# Patient Record
Sex: FEMALE | Race: WHITE | Employment: OTHER
[De-identification: names, ages, dates, MRNs, and addresses within clinical notes are randomized per-mention and may not be internally consistent; named-entity substitution may affect disease eponyms.]

---

## 2017-02-01 DIAGNOSIS — J45.40 MODERATE PERSISTENT ASTHMA WITHOUT COMPLICATION: ICD-10-CM

## 2017-02-01 DIAGNOSIS — K21.9 GASTROESOPHAGEAL REFLUX DISEASE WITHOUT ESOPHAGITIS: ICD-10-CM

## 2017-02-01 RX ORDER — RANITIDINE 150 MG/1
TABLET ORAL
Qty: 180 TABLET | Refills: 5 | Status: SHIPPED | OUTPATIENT
Start: 2017-02-01 | End: 2018-02-06 | Stop reason: SDUPTHER

## 2017-02-01 RX ORDER — MONTELUKAST SODIUM 10 MG/1
TABLET ORAL
Qty: 90 TABLET | Refills: 5 | Status: SHIPPED | OUTPATIENT
Start: 2017-02-01 | End: 2018-02-06 | Stop reason: SDUPTHER

## 2017-02-10 DIAGNOSIS — M85.80 OSTEOPENIA: ICD-10-CM

## 2017-02-11 RX ORDER — RALOXIFENE HYDROCHLORIDE 60 MG/1
TABLET, FILM COATED ORAL
Qty: 90 TABLET | Refills: 0 | Status: SHIPPED | OUTPATIENT
Start: 2017-02-11 | End: 2017-02-28 | Stop reason: ALTCHOICE

## 2017-02-11 RX ORDER — PRAVASTATIN SODIUM 20 MG
TABLET ORAL
Qty: 90 TABLET | Refills: 0 | Status: SHIPPED | OUTPATIENT
Start: 2017-02-11 | End: 2017-08-30 | Stop reason: SDUPTHER

## 2017-02-15 ENCOUNTER — TELEPHONE (OUTPATIENT)
Dept: INTERNAL MEDICINE | Facility: CLINIC | Age: 72
End: 2017-02-15

## 2017-02-15 RX ORDER — AZITHROMYCIN 250 MG/1
TABLET, FILM COATED ORAL
Qty: 1 PACKET | Refills: 0 | Status: SHIPPED | OUTPATIENT
Start: 2017-02-15 | End: 2017-02-28 | Stop reason: ALTCHOICE

## 2017-02-28 ENCOUNTER — OFFICE VISIT (OUTPATIENT)
Dept: INTERNAL MEDICINE | Facility: CLINIC | Age: 72
End: 2017-02-28

## 2017-02-28 VITALS
HEART RATE: 89 BPM | SYSTOLIC BLOOD PRESSURE: 128 MMHG | DIASTOLIC BLOOD PRESSURE: 83 MMHG | TEMPERATURE: 98.3 F | OXYGEN SATURATION: 98 % | WEIGHT: 159 LBS | BODY MASS INDEX: 31.05 KG/M2

## 2017-02-28 DIAGNOSIS — H65.33 CHRONIC MUCOID OTITIS MEDIA OF BOTH EARS: Primary | ICD-10-CM

## 2017-02-28 PROCEDURE — 3014F SCREEN MAMMO DOC REV: CPT | Performed by: INTERNAL MEDICINE

## 2017-02-28 PROCEDURE — 1036F TOBACCO NON-USER: CPT | Performed by: INTERNAL MEDICINE

## 2017-02-28 PROCEDURE — G8399 PT W/DXA RESULTS DOCUMENT: HCPCS | Performed by: INTERNAL MEDICINE

## 2017-02-28 PROCEDURE — 1123F ACP DISCUSS/DSCN MKR DOCD: CPT | Performed by: INTERNAL MEDICINE

## 2017-02-28 PROCEDURE — 3017F COLORECTAL CA SCREEN DOC REV: CPT | Performed by: INTERNAL MEDICINE

## 2017-02-28 PROCEDURE — 1090F PRES/ABSN URINE INCON ASSESS: CPT | Performed by: INTERNAL MEDICINE

## 2017-02-28 PROCEDURE — G8427 DOCREV CUR MEDS BY ELIG CLIN: HCPCS | Performed by: INTERNAL MEDICINE

## 2017-02-28 PROCEDURE — 4040F PNEUMOC VAC/ADMIN/RCVD: CPT | Performed by: INTERNAL MEDICINE

## 2017-02-28 PROCEDURE — G8417 CALC BMI ABV UP PARAM F/U: HCPCS | Performed by: INTERNAL MEDICINE

## 2017-02-28 PROCEDURE — 99213 OFFICE O/P EST LOW 20 MIN: CPT | Performed by: INTERNAL MEDICINE

## 2017-02-28 PROCEDURE — G8484 FLU IMMUNIZE NO ADMIN: HCPCS | Performed by: INTERNAL MEDICINE

## 2017-02-28 RX ORDER — ECHINACEA PURPUREA EXTRACT 125 MG
1 TABLET ORAL PRN
Qty: 1 BOTTLE | Refills: 3 | Status: SHIPPED | OUTPATIENT
Start: 2017-02-28

## 2017-02-28 RX ORDER — PREDNISONE 10 MG/1
TABLET ORAL
Qty: 20 TABLET | Refills: 0 | Status: SHIPPED | OUTPATIENT
Start: 2017-02-28 | End: 2018-10-03

## 2017-02-28 RX ORDER — GUAIFENESIN AND PSEUDOEPHEDRINE HCL 1200; 120 MG/1; MG/1
1 TABLET, EXTENDED RELEASE ORAL 2 TIMES DAILY
Qty: 20 TABLET | Refills: 2 | Status: SHIPPED | OUTPATIENT
Start: 2017-02-28 | End: 2017-10-05 | Stop reason: ALTCHOICE

## 2017-02-28 RX ORDER — FLUTICASONE PROPIONATE 50 MCG
1 SPRAY, SUSPENSION (ML) NASAL DAILY
Qty: 1 BOTTLE | Refills: 3 | Status: SHIPPED | OUTPATIENT
Start: 2017-02-28 | End: 2017-10-05 | Stop reason: SDUPTHER

## 2017-07-03 ENCOUNTER — HOSPITAL ENCOUNTER (EMERGENCY)
Age: 72
Discharge: HOME | End: 2017-07-03
Attending: EMERGENCY MEDICINE
Payer: MEDICARE

## 2017-07-03 VITALS
RESPIRATION RATE: 18 BRPM | WEIGHT: 160.4 LBS | TEMPERATURE: 98.6 F | BODY MASS INDEX: 31.49 KG/M2 | SYSTOLIC BLOOD PRESSURE: 158 MMHG | HEIGHT: 60 IN | OXYGEN SATURATION: 95 % | HEART RATE: 79 BPM | DIASTOLIC BLOOD PRESSURE: 77 MMHG

## 2017-07-03 DIAGNOSIS — L03.211 FACIAL CELLULITIS: Primary | ICD-10-CM

## 2017-07-03 PROCEDURE — 99283 EMERGENCY DEPT VISIT LOW MDM: CPT

## 2017-07-03 RX ORDER — DOXYCYCLINE HYCLATE 100 MG
100 TABLET ORAL 2 TIMES DAILY
Qty: 20 TABLET | Refills: 0 | Status: SHIPPED | OUTPATIENT
Start: 2017-07-03 | End: 2018-10-03

## 2017-07-03 ASSESSMENT — ENCOUNTER SYMPTOMS
SHORTNESS OF BREATH: 0
SINUS PRESSURE: 0
RHINORRHEA: 0
VOMITING: 0
FACIAL SWELLING: 1
ABDOMINAL PAIN: 0
SORE THROAT: 0
DIARRHEA: 0
COUGH: 0
COLOR CHANGE: 0
CONSTIPATION: 0
NAUSEA: 0
WHEEZING: 0

## 2017-08-28 DIAGNOSIS — J45.40 MODERATE PERSISTENT ASTHMA WITHOUT COMPLICATION: ICD-10-CM

## 2017-08-28 RX ORDER — BUDESONIDE AND FORMOTEROL FUMARATE DIHYDRATE 160; 4.5 UG/1; UG/1
AEROSOL RESPIRATORY (INHALATION)
Qty: 30.6 G | Refills: 2 | Status: SHIPPED | OUTPATIENT
Start: 2017-08-28 | End: 2017-10-05 | Stop reason: SDUPTHER

## 2017-08-30 RX ORDER — PRAVASTATIN SODIUM 20 MG
TABLET ORAL
Qty: 90 TABLET | Refills: 0 | Status: SHIPPED | OUTPATIENT
Start: 2017-08-30 | End: 2017-10-26 | Stop reason: SDUPTHER

## 2017-10-05 ENCOUNTER — OFFICE VISIT (OUTPATIENT)
Dept: INTERNAL MEDICINE | Age: 72
End: 2017-10-05
Payer: MEDICARE

## 2017-10-05 VITALS
DIASTOLIC BLOOD PRESSURE: 82 MMHG | WEIGHT: 160 LBS | SYSTOLIC BLOOD PRESSURE: 145 MMHG | OXYGEN SATURATION: 98 % | HEART RATE: 75 BPM | BODY MASS INDEX: 31.25 KG/M2

## 2017-10-05 DIAGNOSIS — Z13.820 OSTEOPOROSIS SCREENING: ICD-10-CM

## 2017-10-05 DIAGNOSIS — J45.40 MODERATE PERSISTENT ASTHMA WITHOUT COMPLICATION: ICD-10-CM

## 2017-10-05 DIAGNOSIS — Z13.220 SCREENING FOR HYPERLIPIDEMIA: Primary | ICD-10-CM

## 2017-10-05 DIAGNOSIS — E11.65 TYPE 2 DIABETES MELLITUS WITH HYPERGLYCEMIA, WITHOUT LONG-TERM CURRENT USE OF INSULIN (HCC): ICD-10-CM

## 2017-10-05 DIAGNOSIS — Z12.39 BREAST CANCER SCREENING: ICD-10-CM

## 2017-10-05 DIAGNOSIS — K21.9 GASTROESOPHAGEAL REFLUX DISEASE WITHOUT ESOPHAGITIS: ICD-10-CM

## 2017-10-05 LAB — HBA1C MFR BLD: 5.9 %

## 2017-10-05 PROCEDURE — 1090F PRES/ABSN URINE INCON ASSESS: CPT | Performed by: INTERNAL MEDICINE

## 2017-10-05 PROCEDURE — 3017F COLORECTAL CA SCREEN DOC REV: CPT | Performed by: INTERNAL MEDICINE

## 2017-10-05 PROCEDURE — G8417 CALC BMI ABV UP PARAM F/U: HCPCS | Performed by: INTERNAL MEDICINE

## 2017-10-05 PROCEDURE — 83036 HEMOGLOBIN GLYCOSYLATED A1C: CPT | Performed by: INTERNAL MEDICINE

## 2017-10-05 PROCEDURE — 3046F HEMOGLOBIN A1C LEVEL >9.0%: CPT | Performed by: INTERNAL MEDICINE

## 2017-10-05 PROCEDURE — G8482 FLU IMMUNIZE ORDER/ADMIN: HCPCS | Performed by: INTERNAL MEDICINE

## 2017-10-05 PROCEDURE — 1123F ACP DISCUSS/DSCN MKR DOCD: CPT | Performed by: INTERNAL MEDICINE

## 2017-10-05 PROCEDURE — 3014F SCREEN MAMMO DOC REV: CPT | Performed by: INTERNAL MEDICINE

## 2017-10-05 PROCEDURE — 99214 OFFICE O/P EST MOD 30 MIN: CPT | Performed by: INTERNAL MEDICINE

## 2017-10-05 PROCEDURE — 1036F TOBACCO NON-USER: CPT | Performed by: INTERNAL MEDICINE

## 2017-10-05 PROCEDURE — 4040F PNEUMOC VAC/ADMIN/RCVD: CPT | Performed by: INTERNAL MEDICINE

## 2017-10-05 PROCEDURE — G8427 DOCREV CUR MEDS BY ELIG CLIN: HCPCS | Performed by: INTERNAL MEDICINE

## 2017-10-05 PROCEDURE — G8399 PT W/DXA RESULTS DOCUMENT: HCPCS | Performed by: INTERNAL MEDICINE

## 2017-10-05 RX ORDER — FLUTICASONE PROPIONATE 50 MCG
1 SPRAY, SUSPENSION (ML) NASAL DAILY
Qty: 1 BOTTLE | Refills: 3 | Status: SHIPPED | OUTPATIENT
Start: 2017-10-05 | End: 2019-05-08

## 2017-10-05 ASSESSMENT — PATIENT HEALTH QUESTIONNAIRE - PHQ9
1. LITTLE INTEREST OR PLEASURE IN DOING THINGS: 0
2. FEELING DOWN, DEPRESSED OR HOPELESS: 0
SUM OF ALL RESPONSES TO PHQ9 QUESTIONS 1 & 2: 0
SUM OF ALL RESPONSES TO PHQ QUESTIONS 1-9: 0

## 2017-10-05 ASSESSMENT — ENCOUNTER SYMPTOMS
EYES NEGATIVE: 1
GASTROINTESTINAL NEGATIVE: 1
COUGH: 1
ALLERGIC/IMMUNOLOGIC NEGATIVE: 1

## 2017-10-05 NOTE — MR AVS SNAPSHOT
After Visit Summary             Delmar Mir   10/5/2017 11:30 AM   Office Visit    Description:  Female : 1945   Provider:  Shelby Morrison MD   Department:  [facility] Internal Medicine [facility]              Your Follow-Up and Future Appointments         Below is a list of your follow-up and future appointments. This may not be a complete list as you may have made appointments directly with providers that we are not aware of or your providers may have made some for you. Please call your providers to confirm appointments. It is important to keep your appointments. Please bring your current insurance card, photo ID, co-pay, and all medication bottles to your appointment. If self-pay, payment is expected at the time of service. Your To-Do List     Future Appointments Provider Department Dept Phone    2018 11:10 AM Shelby Morrison MD [address] [facility] [phone]    Please arrive 15 minutes prior to appointment, bring photo ID and insurance card. Future Orders Complete By Expires    Lipid Panel [LAB18 Custom]  2017 10/5/2018    Microalbumin, Ur [SIX4503 Custom]  2017     Basic Metabolic Panel [BBH27 Custom]  2018 10/4/2018    Comments:    Please get this labwork done before your next visit. Follow-Up    Return in about 6 months (around 2018). Information from Your Visit        Department     Name Address Phone Fax    [facility] Internal Medicine [address] [phone]      You Were Seen for:         Comments    Screening for hyperlipidemia   [[id number]]         Vital Signs     Blood Pressure Pulse Weight Oxygen Saturation Breastfeeding?  Body Mass Index    145/82 (Site: Left Arm, Position: Sitting, Cuff Size: Large Adult) 75 160 lb (72.6 kg) 98% No 31.25 kg/m2    Smoking Status                   Never Smoker           Additional Information about your Body Mass Index (BMI) Your BMI as listed above is considered obese (30 or more). BMI is an estimate of body fat, calculated from your height and weight. The higher your BMI, the greater your risk of heart disease, high blood pressure, type 2 diabetes, stroke, gallstones, arthritis, sleep apnea, and certain cancers. BMI is not perfect. It may overestimate body fat in athletes and people who are more muscular. Even a small weight loss (between 5 and 10 percent of your current weight) by decreasing your calorie intake and becoming more physically active will help lower your risk of developing or worsening diseases associated with obesity. Learn more at: [email or web address]             Today's Medication Changes          These changes are accurate as of: 10/5/17 12:12 PM.  If you have any questions, ask your nurse or doctor.                STOP taking these medications           Pseudoephedrine-guaiFENesin 120-1200 MG Tb12   Stopped by:  Faviola Arreaga MD            Where to Get Your Medications      These medications were sent to [address] [phone]  [address] [place] [address]     Phone:  [phone]     fluticasone 50 MCG/ACT nasal spray               Your Current Medications Are              fluticasone (FLONASE) 50 MCG/ACT nasal spray 1 spray by Nasal route daily    pravastatin (PRAVACHOL) 20 MG tablet Take 1 tablet by mouth  every evening    sodium chloride (ALTAMIST SPRAY) 0.65 % nasal spray 1 spray by Nasal route as needed for Congestion    predniSONE (DELTASONE) 10 MG tablet Take four for two days then three for two days then two for two days then one for two days    montelukast (SINGULAIR) 10 MG tablet Take 1 tablet by mouth  daily    ranitidine (ZANTAC) 150 MG tablet Take 1 tablet by mouth  twice a day    metFORMIN (GLUCOPHAGE) 500 MG tablet Take 1 tablet by mouth 3  times a day BENICAR HCT 20-12.5 MG per tablet Take 1 tablet by mouth  daily    SYMBICORT 160-4.5 MCG/ACT AERO INHALE 2 PUFFS INTO THE LUNGS TWICE A DAY    glucose blood VI test strips (ASCENSIA AUTODISC VI;ONE TOUCH ULTRA TEST VI) strip 1 each by In Vitro route daily Please Check sugar Daily    Spacer/Aero-Holding Chambers FRANK 1 Device by Does not apply route daily as needed. loratadine (CLARITIN) 10 MG tablet Take 10 mg by mouth daily. doxycycline hyclate (VIBRA-TABS) 100 MG tablet Take 1 tablet by mouth 2 times daily    albuterol sulfate HFA (PROAIR HFA) 108 (90 BASE) MCG/ACT inhaler Inhale 2 puffs into the lungs every 6 hours as needed    clobetasol (TEMOVATE) 0.05 % ointment Apply  topically 2 times daily. Apply topically 2 times daily.       Allergies              Asa [Aspirin]     Codeine     Mycinettes     Pcn [Penicillins]     Sulfa Antibiotics       We Ordered/Performed the following            DIABETES FOOT EXAM     POCT glycosylated hemoglobin (Hb A1C)          Additional Information        Basic Information     Date Of Birth Sex Race Ethnicity Preferred Language    1945 Female White Non-/Non  English      Problem List as of 10/5/2017                 Post hysterectomy menopause    Asthma    Stressful life event affecting family    GERD (gastroesophageal reflux disease)    DM2 (diabetes mellitus, type 2) ([facility] [address]    HBP (high blood pressure)    Mitral regurgitation      Immunizations as of 10/5/2017     Name Date    Influenza Virus Vaccine 10/2/2017, 10/15/2013    Influenza, Quadv, 3 yrs and older, IM, Preservative Free 10/11/2016    Pneumococcal 13-valent Conjugate (Larene Klinefeer) 6/7/2016      Preventive Care        Date Due    Diabetic Foot Exam 3/3/2017    Urine Check For Kidney Problems 5/5/2017    Cholesterol Screening 5/5/2017    Hemoglobin A1C (Test For Long-Term Glucose Control) 6/7/2017    Tetanus Combination Vaccine (1 - Tdap) 1/5/2018 (Originally 8/2/1966) Pneumococcal Vaccines (two) for all adults aged 72 and over (2 of 2 - PPSV23) 1/5/2018 (Originally 6/7/2017)    Mammograms are recommended every 2 years for low/average risk patients aged 48 - 69, and every year for high risk patients per updated national guidelines. However these guidelines can be individualized by your provider. 12/1/2017    Eye Exam By An Eye Doctor 8/15/2018    Colonoscopy 2/10/2022            [employer] Signup           Our records indicate that you have an active [employer] account. You can view your After Visit Summary by going to [email or web address] and logging in with your [employer] username and password. If you don't have a [employer] username and password but a parent or guardian has access to your record, the parent or guardian should login with their own [employer] username and password and access your record to view the After Visit Summary. Additional Information  If you have questions, please contact the physician practice where you receive care. Remember, [employer] is NOT to be used for urgent needs. For medical emergencies, dial 911. For questions regarding your [employer] account call [phone]. If you have a clinical question, please call your doctor's office.

## 2017-10-05 NOTE — PROGRESS NOTES
Results   Component Value Date    LABMICR 5 05/05/2016    CREATININE 0.56 08/14/2015     Lab Results   Component Value Date    ALT 14 05/05/2016    AST 14 05/05/2016     Lab Results   Component Value Date    CHOL 149 05/05/2016    TRIG 199 (H) 05/05/2016    HDL 32 (L) 05/05/2016        Patient's allergies, medications, past medical, surgical, social and family histories were reviewed and updated as appropriate. ALLERGIES      Allergies   Allergen Reactions    Asa [Aspirin]     Codeine     Mycinettes     Pcn [Penicillins]     Sulfa Antibiotics          MEDICATIONS:      Current Outpatient Prescriptions   Medication Sig Dispense Refill    pravastatin (PRAVACHOL) 20 MG tablet Take 1 tablet by mouth  every evening 90 tablet 0    fluticasone (FLONASE) 50 MCG/ACT nasal spray 1 spray by Nasal route daily 1 Bottle 3    sodium chloride (ALTAMIST SPRAY) 0.65 % nasal spray 1 spray by Nasal route as needed for Congestion 1 Bottle 3    predniSONE (DELTASONE) 10 MG tablet Take four for two days then three for two days then two for two days then one for two days 20 tablet 0    montelukast (SINGULAIR) 10 MG tablet Take 1 tablet by mouth  daily 90 tablet 5    ranitidine (ZANTAC) 150 MG tablet Take 1 tablet by mouth  twice a day 180 tablet 5    metFORMIN (GLUCOPHAGE) 500 MG tablet Take 1 tablet by mouth 3  times a day 270 tablet 3    BENICAR HCT 20-12.5 MG per tablet Take 1 tablet by mouth  daily 90 tablet 3    SYMBICORT 160-4.5 MCG/ACT AERO INHALE 2 PUFFS INTO THE LUNGS TWICE A DAY 1 Inhaler 5    glucose blood VI test strips (ASCENSIA AUTODISC VI;ONE TOUCH ULTRA TEST VI) strip 1 each by In Vitro route daily Please Check sugar Daily 100 each 5    Spacer/Aero-Holding Chambers FRANK 1 Device by Does not apply route daily as needed. 1 Device 0    loratadine (CLARITIN) 10 MG tablet Take 10 mg by mouth daily.       doxycycline hyclate (VIBRA-TABS) 100 MG tablet Take 1 tablet by mouth 2 times daily 20 tablet 0   

## 2017-10-25 ENCOUNTER — HOSPITAL ENCOUNTER (OUTPATIENT)
Age: 72
Setting detail: SPECIMEN
Discharge: HOME | End: 2017-10-25
Payer: MEDICARE

## 2017-10-25 DIAGNOSIS — Z13.220 SCREENING FOR HYPERLIPIDEMIA: ICD-10-CM

## 2017-10-25 DIAGNOSIS — E11.65 TYPE 2 DIABETES MELLITUS WITH HYPERGLYCEMIA, WITHOUT LONG-TERM CURRENT USE OF INSULIN (HCC): ICD-10-CM

## 2017-10-25 LAB
ANION GAP SERPL CALCULATED.3IONS-SCNC: 12 MMOL/L (ref 9–17)
BUN BLDV-MCNC: 22 MG/DL (ref 8–23)
BUN/CREAT BLD: ABNORMAL (ref 9–20)
CALCIUM SERPL-MCNC: 9.4 MG/DL (ref 8.6–10.4)
CHLORIDE BLD-SCNC: 101 MMOL/L (ref 98–107)
CHOLESTEROL/HDL RATIO: 4.2
CHOLESTEROL: 172 MG/DL
CO2: 29 MMOL/L (ref 20–31)
CREAT SERPL-MCNC: 0.61 MG/DL (ref 0.5–0.9)
CREATININE URINE: 191.6 MG/DL (ref 28–217)
GFR AFRICAN AMERICAN: >60 ML/MIN
GFR NON-AFRICAN AMERICAN: >60 ML/MIN
GFR SERPL CREATININE-BSD FRML MDRD: ABNORMAL ML/MIN/{1.73_M2}
GFR SERPL CREATININE-BSD FRML MDRD: ABNORMAL ML/MIN/{1.73_M2}
GLUCOSE BLD-MCNC: 104 MG/DL (ref 70–99)
HDLC SERPL-MCNC: 41 MG/DL
LDL CHOLESTEROL: 88 MG/DL (ref 0–130)
MICROALBUMIN/CREAT 24H UR: <12 MG/L
MICROALBUMIN/CREAT UR-RTO: 6 MCG/MG CREAT
POTASSIUM SERPL-SCNC: 4 MMOL/L (ref 3.7–5.3)
SODIUM BLD-SCNC: 142 MMOL/L (ref 135–144)
TRIGL SERPL-MCNC: 215 MG/DL
VLDLC SERPL CALC-MCNC: ABNORMAL MG/DL (ref 1–30)

## 2017-10-26 RX ORDER — PRAVASTATIN SODIUM 40 MG
TABLET ORAL
Qty: 30 TABLET | Refills: 5 | Status: SHIPPED | OUTPATIENT
Start: 2017-10-26 | End: 2017-10-31 | Stop reason: SDUPTHER

## 2017-10-31 RX ORDER — PRAVASTATIN SODIUM 40 MG
TABLET ORAL
Qty: 90 TABLET | Refills: 2 | Status: SHIPPED | OUTPATIENT
Start: 2017-10-31 | End: 2018-07-14 | Stop reason: SDUPTHER

## 2017-10-31 NOTE — TELEPHONE ENCOUNTER
Patient needs 90 Day supply sent to [facility] Maintenance   Topic Date Due    DTaP/Tdap/Td vaccine (1 - Tdap) 01/05/2018 (Originally 8/2/1966)    Pneumococcal low/med risk (2 of 2 - PPSV23) 01/05/2018 (Originally 6/7/2017)    Breast cancer screen  12/01/2017    Diabetic retinal exam  08/15/2018    Diabetic foot exam  10/05/2018    Diabetic hemoglobin A1C test  10/05/2018    Diabetic microalbuminuria test  10/25/2018    Lipid screen  10/25/2018    Colon cancer screen colonoscopy  02/10/2022    Zostavax vaccine  Addressed    DEXA (modify frequency per FRAX score)  Addressed    Flu vaccine  Completed    Hepatitis C screen  Addressed             (applicable per patient's age: Cancer Screenings, Depression Screening, Fall Risk Screening, Immunizations)    Hemoglobin A1C (%)   Date Value   10/05/2017 5.9   06/07/2016 5.9   03/03/2016 6.4     Microalb/Crt.  Ratio (mcg/mg creat)   Date Value   10/25/2017 6     LDL Cholesterol (mg/dL)   Date Value   10/25/2017 88     AST (U/L)   Date Value   05/05/2016 14     ALT (U/L)   Date Value   05/05/2016 14     BUN (mg/dL)   Date Value   10/25/2017 22      (goal A1C is < 7)   (goal LDL is <100) need 30-50% reduction from baseline     BP Readings from Last 3 Encounters:   10/05/17 (!) 145/82   07/03/17 (!) 158/77   02/28/17 128/83    (goal /80)      All Future Testing planned in [facility]:      Next Visit Date:  Future Appointments  Date Time Provider Daniel Marc   4/12/2018 10:50 AM Oscar Kothari MD [facility]            Patient Active Problem List:     Post hysterectomy menopause     Asthma     Stressful life event affecting family     GERD (gastroesophageal reflux disease)     DM2 (diabetes mellitus, type 2) ([facility] [address]     HBP (high blood pressure)     Mitral regurgitation

## 2017-11-14 DIAGNOSIS — I10 ESSENTIAL HYPERTENSION: ICD-10-CM

## 2017-11-14 RX ORDER — OLMESARTAN MEDOXOMIL-HYDROCHLOROTHIAZIDE 20; 12.5 MG/1; MG/1
TABLET, FILM COATED ORAL
Qty: 90 TABLET | Refills: 1 | Status: SHIPPED | OUTPATIENT
Start: 2017-11-14 | End: 2018-04-12 | Stop reason: SDUPTHER

## 2017-12-22 ENCOUNTER — TELEPHONE (OUTPATIENT)
Dept: INTERNAL MEDICINE | Age: 72
End: 2017-12-22

## 2018-01-26 ENCOUNTER — OFFICE VISIT (OUTPATIENT)
Dept: INTERNAL MEDICINE | Age: 73
End: 2018-01-26
Payer: MEDICARE

## 2018-01-26 VITALS
SYSTOLIC BLOOD PRESSURE: 169 MMHG | HEART RATE: 70 BPM | WEIGHT: 166 LBS | BODY MASS INDEX: 32.59 KG/M2 | DIASTOLIC BLOOD PRESSURE: 91 MMHG | HEIGHT: 60 IN

## 2018-01-26 DIAGNOSIS — R68.89 FLU-LIKE SYMPTOMS: Primary | ICD-10-CM

## 2018-01-26 LAB
INFLUENZA A ANTIBODY: NEGATIVE
INFLUENZA B ANTIBODY: NEGATIVE

## 2018-01-26 PROCEDURE — 4040F PNEUMOC VAC/ADMIN/RCVD: CPT | Performed by: INTERNAL MEDICINE

## 2018-01-26 PROCEDURE — 3014F SCREEN MAMMO DOC REV: CPT | Performed by: INTERNAL MEDICINE

## 2018-01-26 PROCEDURE — 1090F PRES/ABSN URINE INCON ASSESS: CPT | Performed by: INTERNAL MEDICINE

## 2018-01-26 PROCEDURE — G8417 CALC BMI ABV UP PARAM F/U: HCPCS | Performed by: INTERNAL MEDICINE

## 2018-01-26 PROCEDURE — G8399 PT W/DXA RESULTS DOCUMENT: HCPCS | Performed by: INTERNAL MEDICINE

## 2018-01-26 PROCEDURE — 87804 INFLUENZA ASSAY W/OPTIC: CPT | Performed by: INTERNAL MEDICINE

## 2018-01-26 PROCEDURE — G8427 DOCREV CUR MEDS BY ELIG CLIN: HCPCS | Performed by: INTERNAL MEDICINE

## 2018-01-26 PROCEDURE — 99214 OFFICE O/P EST MOD 30 MIN: CPT | Performed by: INTERNAL MEDICINE

## 2018-01-26 PROCEDURE — 3017F COLORECTAL CA SCREEN DOC REV: CPT | Performed by: INTERNAL MEDICINE

## 2018-01-26 PROCEDURE — 1036F TOBACCO NON-USER: CPT | Performed by: INTERNAL MEDICINE

## 2018-01-26 PROCEDURE — 1123F ACP DISCUSS/DSCN MKR DOCD: CPT | Performed by: INTERNAL MEDICINE

## 2018-01-26 PROCEDURE — G8482 FLU IMMUNIZE ORDER/ADMIN: HCPCS | Performed by: INTERNAL MEDICINE

## 2018-01-26 ASSESSMENT — ENCOUNTER SYMPTOMS
RHINORRHEA: 1
COUGH: 1

## 2018-01-26 NOTE — PROGRESS NOTES
[facility] INTERNAL MEDICINE [address]  [address] [facility] [address]  Dept: [phone]  Dept Fax: [phone]    Jassi Knight is a 81 y.o. female who presents today for   Chief Complaint   Patient presents with    Other     Flu symptoms    and follow up of chronic medical problems:   Patient Active Problem List   Diagnosis    Post hysterectomy menopause    Asthma    Stressful life event affecting family    GERD (gastroesophageal reflux disease)    DM2 (diabetes mellitus, type 2) ([address]    HBP (high blood pressure)    Mitral regurgitation   . Past Medical History:   Diagnosis Date    Asthma     GERD (gastroesophageal reflux disease)     Hypertension     Type II or unspecified type diabetes mellitus without mention of complication, not stated as uncontrolled        No past surgical history on file. No family history on file.     Social History   Substance Use Topics    Smoking status: Never Smoker    Smokeless tobacco: Never Used    Alcohol use No      Current Outpatient Prescriptions   Medication Sig Dispense Refill    BENICAR HCT 20-12.5 MG per tablet TAKE 1 TABLET BY MOUTH  DAILY 90 tablet 1    pravastatin (PRAVACHOL) 40 MG tablet Take 1 tablet by mouth  every evening 90 tablet 2    fluticasone (FLONASE) 50 MCG/ACT nasal spray 1 spray by Nasal route daily 1 Bottle 3    doxycycline hyclate (VIBRA-TABS) 100 MG tablet Take 1 tablet by mouth 2 times daily 20 tablet 0    sodium chloride (ALTAMIST SPRAY) 0.65 % nasal spray 1 spray by Nasal route as needed for Congestion 1 Bottle 3    predniSONE (DELTASONE) 10 MG tablet Take four for two days then three for two days then two for two days then one for two days 20 tablet 0    montelukast (SINGULAIR) 10 MG tablet Take 1 tablet by mouth  daily 90 tablet 5    ranitidine (ZANTAC) 150 MG tablet Take 1 tablet by mouth  twice a day 180 tablet 5    metFORMIN (GLUCOPHAGE) 500 MG tablet Take 1 tablet by mouth 3

## 2018-02-01 ENCOUNTER — TELEPHONE (OUTPATIENT)
Dept: INTERNAL MEDICINE | Age: 73
End: 2018-02-01

## 2018-02-01 DIAGNOSIS — J45.909 MODERATE ASTHMA WITHOUT COMPLICATION, UNSPECIFIED WHETHER PERSISTENT: Primary | ICD-10-CM

## 2018-02-01 PROBLEM — N90.4 VULVAR DYSTROPHY: Status: ACTIVE | Noted: 2017-04-03

## 2018-02-01 NOTE — TELEPHONE ENCOUNTER
PT called requesting a refill of her albuterol nebulizer solution. PT stated that she has not had this medication in over three years and the box she has at home is . She only uses this when she is sick. PT saw  on 18 for a flu like sx and still can't get rid of her cough. Please send script to [facility] in [place] if approved. Writer will call PT if meds are approved and sent to pharmacy.  Please advise

## 2018-02-06 DIAGNOSIS — J45.40 MODERATE PERSISTENT ASTHMA WITHOUT COMPLICATION: ICD-10-CM

## 2018-02-06 DIAGNOSIS — K21.9 GASTROESOPHAGEAL REFLUX DISEASE WITHOUT ESOPHAGITIS: ICD-10-CM

## 2018-02-08 RX ORDER — RANITIDINE 150 MG/1
TABLET ORAL
Qty: 180 TABLET | Refills: 5 | Status: SHIPPED | OUTPATIENT
Start: 2018-02-08 | End: 2019-03-04 | Stop reason: SDUPTHER

## 2018-02-08 RX ORDER — MONTELUKAST SODIUM 10 MG/1
TABLET ORAL
Qty: 90 TABLET | Refills: 5 | Status: SHIPPED | OUTPATIENT
Start: 2018-02-08 | End: 2019-03-04 | Stop reason: SDUPTHER

## 2018-04-12 ENCOUNTER — OFFICE VISIT (OUTPATIENT)
Dept: INTERNAL MEDICINE | Age: 73
End: 2018-04-12
Payer: MEDICARE

## 2018-04-12 VITALS
HEART RATE: 78 BPM | HEIGHT: 60 IN | DIASTOLIC BLOOD PRESSURE: 83 MMHG | WEIGHT: 164 LBS | BODY MASS INDEX: 32.2 KG/M2 | OXYGEN SATURATION: 94 % | SYSTOLIC BLOOD PRESSURE: 135 MMHG

## 2018-04-12 DIAGNOSIS — I10 ESSENTIAL HYPERTENSION: ICD-10-CM

## 2018-04-12 DIAGNOSIS — Z11.59 NEED FOR HEPATITIS C SCREENING TEST: ICD-10-CM

## 2018-04-12 DIAGNOSIS — E11.65 TYPE 2 DIABETES MELLITUS WITH HYPERGLYCEMIA, WITHOUT LONG-TERM CURRENT USE OF INSULIN (HCC): Primary | ICD-10-CM

## 2018-04-12 DIAGNOSIS — Z11.4 ENCOUNTER FOR SCREENING FOR HIV: ICD-10-CM

## 2018-04-12 DIAGNOSIS — Z12.11 COLON CANCER SCREENING: ICD-10-CM

## 2018-04-12 PROCEDURE — G8417 CALC BMI ABV UP PARAM F/U: HCPCS | Performed by: INTERNAL MEDICINE

## 2018-04-12 PROCEDURE — G8399 PT W/DXA RESULTS DOCUMENT: HCPCS | Performed by: INTERNAL MEDICINE

## 2018-04-12 PROCEDURE — 99214 OFFICE O/P EST MOD 30 MIN: CPT | Performed by: INTERNAL MEDICINE

## 2018-04-12 PROCEDURE — 1090F PRES/ABSN URINE INCON ASSESS: CPT | Performed by: INTERNAL MEDICINE

## 2018-04-12 PROCEDURE — 3014F SCREEN MAMMO DOC REV: CPT | Performed by: INTERNAL MEDICINE

## 2018-04-12 PROCEDURE — 2022F DILAT RTA XM EVC RTNOPTHY: CPT | Performed by: INTERNAL MEDICINE

## 2018-04-12 PROCEDURE — 1036F TOBACCO NON-USER: CPT | Performed by: INTERNAL MEDICINE

## 2018-04-12 PROCEDURE — G8427 DOCREV CUR MEDS BY ELIG CLIN: HCPCS | Performed by: INTERNAL MEDICINE

## 2018-04-12 PROCEDURE — 4040F PNEUMOC VAC/ADMIN/RCVD: CPT | Performed by: INTERNAL MEDICINE

## 2018-04-12 PROCEDURE — 1123F ACP DISCUSS/DSCN MKR DOCD: CPT | Performed by: INTERNAL MEDICINE

## 2018-04-12 PROCEDURE — 3017F COLORECTAL CA SCREEN DOC REV: CPT | Performed by: INTERNAL MEDICINE

## 2018-04-12 PROCEDURE — 3046F HEMOGLOBIN A1C LEVEL >9.0%: CPT | Performed by: INTERNAL MEDICINE

## 2018-04-12 RX ORDER — OLMESARTAN MEDOXOMIL-HYDROCHLOROTHIAZIDE 20; 12.5 MG/1; MG/1
TABLET, FILM COATED ORAL
Qty: 90 TABLET | Refills: 1 | Status: SHIPPED | OUTPATIENT
Start: 2018-04-12 | End: 2018-05-02

## 2018-04-12 ASSESSMENT — ENCOUNTER SYMPTOMS
GASTROINTESTINAL NEGATIVE: 1
ALLERGIC/IMMUNOLOGIC NEGATIVE: 1
EYES NEGATIVE: 1
RESPIRATORY NEGATIVE: 1

## 2018-04-16 ENCOUNTER — TELEPHONE (OUTPATIENT)
Dept: GASTROENTEROLOGY | Age: 73
End: 2018-04-16

## 2018-04-17 DIAGNOSIS — Z12.11 SCREENING FOR COLON CANCER: Primary | ICD-10-CM

## 2018-05-02 ENCOUNTER — TELEPHONE (OUTPATIENT)
Dept: INTERNAL MEDICINE | Age: 73
End: 2018-05-02

## 2018-05-02 DIAGNOSIS — I10 ESSENTIAL HYPERTENSION: Primary | ICD-10-CM

## 2018-05-02 RX ORDER — VALSARTAN 40 MG/1
40 TABLET ORAL DAILY
Qty: 14 TABLET | Refills: 0 | Status: SHIPPED | OUTPATIENT
Start: 2018-05-02 | End: 2018-05-11 | Stop reason: SDUPTHER

## 2018-05-08 ENCOUNTER — HOSPITAL ENCOUNTER (OUTPATIENT)
Age: 73
Setting detail: OUTPATIENT SURGERY
Discharge: HOME | End: 2018-05-08
Attending: INTERNAL MEDICINE | Admitting: INTERNAL MEDICINE
Payer: MEDICARE

## 2018-05-08 VITALS
RESPIRATION RATE: 16 BRPM | OXYGEN SATURATION: 96 % | HEART RATE: 60 BPM | WEIGHT: 164 LBS | SYSTOLIC BLOOD PRESSURE: 125 MMHG | HEIGHT: 60 IN | BODY MASS INDEX: 32.2 KG/M2 | TEMPERATURE: 97.5 F | DIASTOLIC BLOOD PRESSURE: 62 MMHG

## 2018-05-08 LAB — GLUCOSE BLD-MCNC: 110 MG/DL (ref 65–105)

## 2018-05-08 PROCEDURE — 99152 MOD SED SAME PHYS/QHP 5/>YRS: CPT | Performed by: INTERNAL MEDICINE

## 2018-05-08 PROCEDURE — 3609010300 HC COLONOSCOPY W/BIOPSY SINGLE/MULTIPLE: Performed by: INTERNAL MEDICINE

## 2018-05-08 PROCEDURE — 99153 MOD SED SAME PHYS/QHP EA: CPT | Performed by: INTERNAL MEDICINE

## 2018-05-08 PROCEDURE — 6360000002 HC RX W HCPCS: Performed by: INTERNAL MEDICINE

## 2018-05-08 PROCEDURE — 7100000010 HC PHASE II RECOVERY - FIRST 15 MIN: Performed by: INTERNAL MEDICINE

## 2018-05-08 PROCEDURE — 82947 ASSAY GLUCOSE BLOOD QUANT: CPT

## 2018-05-08 PROCEDURE — 7100000011 HC PHASE II RECOVERY - ADDTL 15 MIN: Performed by: INTERNAL MEDICINE

## 2018-05-08 PROCEDURE — 88305 TISSUE EXAM BY PATHOLOGIST: CPT

## 2018-05-08 PROCEDURE — 2580000003 HC RX 258: Performed by: INTERNAL MEDICINE

## 2018-05-08 RX ORDER — MIDAZOLAM HYDROCHLORIDE 1 MG/ML
INJECTION INTRAMUSCULAR; INTRAVENOUS PRN
Status: DISCONTINUED | OUTPATIENT
Start: 2018-05-08 | End: 2018-05-08 | Stop reason: HOSPADM

## 2018-05-08 RX ORDER — SODIUM CHLORIDE 9 MG/ML
INJECTION, SOLUTION INTRAVENOUS CONTINUOUS
Status: DISCONTINUED | OUTPATIENT
Start: 2018-05-08 | End: 2018-05-08 | Stop reason: HOSPADM

## 2018-05-08 RX ORDER — FENTANYL CITRATE 50 UG/ML
INJECTION, SOLUTION INTRAMUSCULAR; INTRAVENOUS PRN
Status: DISCONTINUED | OUTPATIENT
Start: 2018-05-08 | End: 2018-05-08 | Stop reason: HOSPADM

## 2018-05-08 RX ADMIN — SODIUM CHLORIDE: 9 INJECTION, SOLUTION INTRAVENOUS at 10:06

## 2018-05-08 ASSESSMENT — PAIN SCALES - GENERAL
PAINLEVEL_OUTOF10: 0
PAINLEVEL_OUTOF10: 0

## 2018-05-08 ASSESSMENT — PAIN - FUNCTIONAL ASSESSMENT: PAIN_FUNCTIONAL_ASSESSMENT: 0-10

## 2018-05-09 LAB — SURGICAL PATHOLOGY REPORT: NORMAL

## 2018-05-11 DIAGNOSIS — I10 ESSENTIAL HYPERTENSION: ICD-10-CM

## 2018-05-12 RX ORDER — VALSARTAN 40 MG/1
40 TABLET ORAL DAILY
Qty: 90 TABLET | Refills: 3 | Status: SHIPPED | OUTPATIENT
Start: 2018-05-12 | End: 2018-08-06

## 2018-05-15 PROBLEM — D12.6 TUBULAR ADENOMA OF COLON: Status: ACTIVE | Noted: 2018-05-08

## 2018-06-07 RX ORDER — TELMISARTAN 20 MG/1
20 TABLET ORAL DAILY
Qty: 30 TABLET | Refills: 3 | Status: SHIPPED | OUTPATIENT
Start: 2018-06-07 | End: 2018-08-06 | Stop reason: SDUPTHER

## 2018-07-12 ENCOUNTER — HOSPITAL ENCOUNTER (EMERGENCY)
Age: 73
Discharge: HOME | End: 2018-07-12
Attending: EMERGENCY MEDICINE
Payer: MEDICARE

## 2018-07-12 ENCOUNTER — APPOINTMENT (OUTPATIENT)
Dept: GENERAL RADIOLOGY | Age: 73
End: 2018-07-12
Payer: MEDICARE

## 2018-07-12 VITALS
BODY MASS INDEX: 34.64 KG/M2 | DIASTOLIC BLOOD PRESSURE: 86 MMHG | HEIGHT: 60 IN | TEMPERATURE: 98.2 F | SYSTOLIC BLOOD PRESSURE: 187 MMHG | RESPIRATION RATE: 14 BRPM | WEIGHT: 176.44 LBS | HEART RATE: 80 BPM | OXYGEN SATURATION: 98 %

## 2018-07-12 DIAGNOSIS — S20.212A CONTUSION OF RIB ON LEFT SIDE, INITIAL ENCOUNTER: Primary | ICD-10-CM

## 2018-07-12 PROCEDURE — 99283 EMERGENCY DEPT VISIT LOW MDM: CPT

## 2018-07-12 PROCEDURE — 71101 X-RAY EXAM UNILAT RIBS/CHEST: CPT

## 2018-07-12 ASSESSMENT — PAIN SCALES - GENERAL: PAINLEVEL_OUTOF10: 5

## 2018-07-12 NOTE — ED PROVIDER NOTES
[facility]  Emergency Medicine Department    Pt Name: Rhett Ferguson  MRN: [id number]  [place] 1945  Date of evaluation: 7/12/2018  Provider: Art Anand MD    CHIEF COMPLAINT       Chief Complaint   Patient presents with    Fall       HISTORY OF PRESENT ILLNESS  (Location/Symptom, Timing/Onset, Context/Setting, Quality, Duration, Modifying Factors, Severity.)   Rhett Ferguson is a 81 y.o. female who presents to the emergency department Complaining of pain to the left side of her chest ever since she fell on July 4. She reports a mechanical fall onto her left chest.  The pain is improving but is still present. She has not seen her doctor concerning the pain since the original injury. She rates her pain today as a 5 out of 10. She has been taking Tylenol up to 3 times a day for the pain which seems to help only mildly. She reports the pain is worse with movement and with deep breathing. Nursing Notes were reviewed. ALLERGIES     Asa [aspirin];  Codeine; Mycinettes; Pcn [penicillins]; and Sulfa antibiotics    CURRENT MEDICATIONS       Discharge Medication List as of 7/12/2018 10:31 AM      CONTINUE these medications which have NOT CHANGED    Details   telmisartan (MICARDIS) 20 MG tablet Take 1 tablet by mouth daily, Disp-30 tablet, R-3Normal      valsartan (DIOVAN) 40 MG tablet Take 1 tablet by mouth daily, Disp-90 tablet, R-3Normal      montelukast (SINGULAIR) 10 MG tablet TAKE 1 TABLET BY MOUTH  DAILY, Disp-90 tablet, R-5Normal      ranitidine (ZANTAC) 150 MG tablet TAKE 1 TABLET BY MOUTH  TWICE A DAY, Disp-180 tablet, R-5Normal      metFORMIN (GLUCOPHAGE) 500 MG tablet TAKE 1 TABLET BY MOUTH 3  TIMES A DAY, Disp-270 tablet, R-3Normal      PROAIR  (90 Base) MCG/ACT inhaler INHALE 2 PUFFS INTO THE  LUNGS EVERY 6 HOURS AS  NEEDED, Disp-34 g, R-3Normal      pravastatin (PRAVACHOL) 40 MG tablet Take 1 tablet by mouth  every evening, Disp-90 tablet, R-2Normal      fluticasone (FLONASE) 50 MCG/ACT nasal spray 1 spray by Nasal route daily, Disp-1 Bottle, R-3Normal      doxycycline hyclate (VIBRA-TABS) 100 MG tablet Take 1 tablet by mouth 2 times daily, Disp-20 tablet, R-0Print      sodium chloride (ALTAMIST SPRAY) 0.65 % nasal spray 1 spray by Nasal route as needed for Congestion, Disp-1 Bottle, R-3      predniSONE (DELTASONE) 10 MG tablet Take four for two days then three for two days then two for two days then one for two days, Disp-20 tablet, R-0      SYMBICORT 160-4.5 MCG/ACT AERO INHALE 2 PUFFS INTO THE LUNGS TWICE A DAY, Disp-1 Inhaler, R-5      glucose blood VI test strips (ASCENSIA AUTODISC VI;ONE TOUCH ULTRA TEST VI) strip DAILY Starting 3/3/2016, Until Discontinued, Disp-100 each, R-5, PrintPlease Check sugar Daily      Spacer/Aero-Holding Chambers FRANK DAILY PRN Starting 3/4/2015, Until Discontinued, Disp-1 Device, R-0, Print      loratadine (CLARITIN) 10 MG tablet Take 10 mg by mouth daily. clobetasol (TEMOVATE) 0.05 % ointment Apply  topically 2 times daily. Apply topically 2 times daily. , Topical, Historical Med             PAST MEDICAL HISTORY         Diagnosis Date    Asthma     Diverticulosis of colon     GERD (gastroesophageal reflux disease)     Hypertension     Tubular adenoma of colon 05/08/2018    Type II or unspecified type diabetes mellitus without mention of complication, not stated as uncontrolled        SURGICAL HISTORY           Procedure Laterality Date    APPENDECTOMY      CATARACT REMOVAL WITH IMPLANT Bilateral     COLONOSCOPY  05/08/2018    Small polyp right colon--TUBULAR ADENOMA, diverticulosis    COLONOSCOPY N/A 5/8/2018    COLONOSCOPY WITH BIOPSY performed by Kiko Snyder MD at [address] [place] [address]      subtotal    TONSILLECTOMY AND ADENOIDECTOMY         FAMILY HISTORY     History reviewed. No pertinent family history. No family status information on file.         SOCIAL HISTORY      reports that she has never smoked. She has never used smokeless tobacco. She reports that she does not drink alcohol or use drugs. REVIEW OF SYSTEMS    (2-9 systems for level 4, 10 or more for level 5)     Review of Systems  GEN: no fevers  HEENT: No neck pain  CV: +chest wall pain  Pulm: No SOB, No wheezing, No cough  GI: No abdominal pain, No Nausea, No Vomiting  Neuro: No HA, No numbness. No weakness  MSK: No other msk injuries  Skin: No bruising    Except as noted above the remainder of the review of systems was reviewed and negative. PHYSICAL EXAM    (up to 7 for level 4, 8 or more for level 5)     ED Triage Vitals [07/12/18 0821]   BP Temp Temp Source Pulse Resp SpO2 Height Weight   (!) 187/86 98.2 °F (36.8 °C) Oral 80 14 98 % 5' (1.524 m) 176 lb 7 oz (80 kg)       Physical Exam   Constitutional: She is oriented to person, place, and time. She appears well-developed and well-nourished. No distress. HENT:   Head: Normocephalic and atraumatic. Eyes: EOM are normal. Pupils are equal, round, and reactive to light. Neck: Normal range of motion. Neck supple. Cardiovascular: Normal rate, regular rhythm, normal heart sounds and intact distal pulses. Pulmonary/Chest: Effort normal and breath sounds normal. No respiratory distress. She exhibits tenderness (left chest wall). Abdominal: Soft. There is no tenderness. There is no guarding. Musculoskeletal: Normal range of motion. She exhibits no tenderness or deformity. Neurological: She is alert and oriented to person, place, and time. Skin: Skin is warm and dry. No overlying ecchymosis over the left chest wall   Psychiatric: She has a normal mood and affect. Her behavior is normal. Judgment and thought content normal.   Nursing note and vitals reviewed.       DIAGNOSTIC RESULTS     RADIOLOGY:   Non-plain film images such as CT, Ultrasound and MRI are read by the radiologist. Plain radiographic images are visualized and preliminarily interpreted by the emergency

## 2018-07-12 NOTE — ED NOTES
Patient presents to the er with c/o pain to the left rib cage area under the breast. Patient verbalizing that she fell on July 4th and continuing to have pain. No bruising noted to the site. Patient concerned with inability to take a deep breath, has hx of Asthma.       Ever Vargas RN  07/12/18 [id number]

## 2018-07-16 RX ORDER — PRAVASTATIN SODIUM 40 MG
TABLET ORAL
Qty: 90 TABLET | Refills: 5 | Status: SHIPPED | OUTPATIENT
Start: 2018-07-16 | End: 2019-06-10 | Stop reason: SDUPTHER

## 2018-07-16 NOTE — TELEPHONE ENCOUNTER
disease)     DM2 (diabetes mellitus, type 2) (HCC)     HBP (high blood pressure)     Mitral regurgitation     Vulvar dystrophy     Tubular adenoma of colon     Diverticulosis of colon

## 2018-08-06 RX ORDER — TELMISARTAN 20 MG/1
20 TABLET ORAL DAILY
Qty: 30 TABLET | Refills: 3 | Status: SHIPPED | OUTPATIENT
Start: 2018-08-06 | End: 2018-10-03 | Stop reason: ALTCHOICE

## 2018-08-06 NOTE — TELEPHONE ENCOUNTER
Next Visit Date:  Future Appointments  Date Time Provider Daniel Marc   9/17/2018 12:40 PM Zoey Schultz MD [facility]   10/8/2018 1:00 PM Zoey Schultz MD [facility] [address] Maintenance   Topic Date Due    DTaP/Tdap/Td vaccine (1 - Tdap) 08/02/1966    Shingles Vaccine (1 of 2 - 2 Dose Series) 08/02/1997    Pneumococcal low/med risk (2 of 2 - PPSV23) 06/07/2017    Breast cancer screen  12/01/2017    Diabetic retinal exam  08/15/2018    Flu vaccine (1) 09/01/2018    Diabetic foot exam  10/05/2018    A1C test (Diabetic or Prediabetic)  10/05/2018    Diabetic microalbuminuria test  10/25/2018    Lipid screen  10/25/2018    Potassium monitoring  10/25/2018    Creatinine monitoring  10/25/2018    Colon cancer screen colonoscopy  05/08/2023    DEXA (modify frequency per FRAX score)  Addressed    Hepatitis C screen  Addressed       Hemoglobin A1C (%)   Date Value   10/05/2017 5.9   06/07/2016 5.9   03/03/2016 6.4             ( goal A1C is < 7)   Microalb/Crt.  Ratio (mcg/mg creat)   Date Value   10/25/2017 6     LDL Cholesterol (mg/dL)   Date Value   10/25/2017 88   05/05/2016 77       (goal LDL is <100)   AST (U/L)   Date Value   05/05/2016 14     ALT (U/L)   Date Value   05/05/2016 14     BUN (mg/dL)   Date Value   10/25/2017 22     BP Readings from Last 3 Encounters:   07/12/18 (!) 187/86   05/08/18 125/62   04/12/18 135/83          (goal 120/80)    All Future Testing planned in [facility]  Lab Frequency Next Occurrence   Comprehensive Metabolic Panel Once 94/93/3162   Hepatitis C Antibody Once 07/21/2018   HIV Screen Once 07/21/2018   Lipid Panel Once 07/21/2018   Hemoglobin A1C Once 07/21/2018   Microalbumin, Ur Once 07/21/2018               Patient Active Problem List:     Post hysterectomy menopause     Asthma     Stressful life event affecting family     GERD (gastroesophageal reflux disease)     DM2 (diabetes mellitus, type 2) (HCC)     HBP (high blood pressure)     Mitral

## 2018-08-30 ENCOUNTER — HOSPITAL ENCOUNTER (OUTPATIENT)
Age: 73
Setting detail: SPECIMEN
Discharge: HOME | End: 2018-08-30
Payer: MEDICARE

## 2018-08-30 DIAGNOSIS — I10 ESSENTIAL HYPERTENSION: ICD-10-CM

## 2018-08-30 DIAGNOSIS — E11.65 TYPE 2 DIABETES MELLITUS WITH HYPERGLYCEMIA, WITHOUT LONG-TERM CURRENT USE OF INSULIN (HCC): ICD-10-CM

## 2018-08-30 DIAGNOSIS — Z11.59 NEED FOR HEPATITIS C SCREENING TEST: ICD-10-CM

## 2018-08-30 DIAGNOSIS — Z11.4 ENCOUNTER FOR SCREENING FOR HIV: ICD-10-CM

## 2018-08-30 LAB
ALBUMIN SERPL-MCNC: 4 G/DL (ref 3.5–5.2)
ALBUMIN/GLOBULIN RATIO: 1.4 (ref 1–2.5)
ALP BLD-CCNC: 72 U/L (ref 35–104)
ALT SERPL-CCNC: 17 U/L (ref 5–33)
ANION GAP SERPL CALCULATED.3IONS-SCNC: 14 MMOL/L (ref 9–17)
AST SERPL-CCNC: 18 U/L
BILIRUB SERPL-MCNC: 0.39 MG/DL (ref 0.3–1.2)
BUN BLDV-MCNC: 20 MG/DL (ref 8–23)
BUN/CREAT BLD: ABNORMAL (ref 9–20)
CALCIUM SERPL-MCNC: 9.2 MG/DL (ref 8.6–10.4)
CHLORIDE BLD-SCNC: 106 MMOL/L (ref 98–107)
CHOLESTEROL/HDL RATIO: 4.2
CHOLESTEROL: 152 MG/DL
CO2: 26 MMOL/L (ref 20–31)
CREAT SERPL-MCNC: 0.6 MG/DL (ref 0.5–0.9)
CREATININE URINE: 174.3 MG/DL (ref 28–217)
ESTIMATED AVERAGE GLUCOSE: 131 MG/DL
GFR AFRICAN AMERICAN: >60 ML/MIN
GFR NON-AFRICAN AMERICAN: >60 ML/MIN
GFR SERPL CREATININE-BSD FRML MDRD: ABNORMAL ML/MIN/{1.73_M2}
GFR SERPL CREATININE-BSD FRML MDRD: ABNORMAL ML/MIN/{1.73_M2}
GLUCOSE BLD-MCNC: 113 MG/DL (ref 70–99)
HBA1C MFR BLD: 6.2 % (ref 4–6)
HDLC SERPL-MCNC: 36 MG/DL
HEPATITIS C ANTIBODY: NONREACTIVE
HIV AG/AB: NONREACTIVE
LDL CHOLESTEROL: 57 MG/DL (ref 0–130)
MICROALBUMIN/CREAT 24H UR: <12 MG/L
MICROALBUMIN/CREAT UR-RTO: NORMAL MCG/MG CREAT
POTASSIUM SERPL-SCNC: 4.3 MMOL/L (ref 3.7–5.3)
SODIUM BLD-SCNC: 146 MMOL/L (ref 135–144)
TOTAL PROTEIN: 6.8 G/DL (ref 6.4–8.3)
TRIGL SERPL-MCNC: 294 MG/DL
VLDLC SERPL CALC-MCNC: ABNORMAL MG/DL (ref 1–30)

## 2018-09-01 DIAGNOSIS — E78.5 DYSLIPIDEMIA: Primary | ICD-10-CM

## 2018-09-01 RX ORDER — FENOFIBRATE 48 MG/1
48 TABLET, COATED ORAL DAILY
Qty: 30 TABLET | Refills: 3 | Status: SHIPPED | OUTPATIENT
Start: 2018-09-01 | End: 2018-12-24 | Stop reason: SDUPTHER

## 2018-10-03 ENCOUNTER — OFFICE VISIT (OUTPATIENT)
Dept: INTERNAL MEDICINE | Age: 73
End: 2018-10-03
Payer: MEDICARE

## 2018-10-03 VITALS
HEART RATE: 90 BPM | BODY MASS INDEX: 33.28 KG/M2 | SYSTOLIC BLOOD PRESSURE: 134 MMHG | DIASTOLIC BLOOD PRESSURE: 72 MMHG | WEIGHT: 170.4 LBS

## 2018-10-03 DIAGNOSIS — I35.0 NONRHEUMATIC AORTIC VALVE STENOSIS: ICD-10-CM

## 2018-10-03 DIAGNOSIS — Z00.00 HEALTHCARE MAINTENANCE: ICD-10-CM

## 2018-10-03 DIAGNOSIS — I10 ESSENTIAL HYPERTENSION: Primary | ICD-10-CM

## 2018-10-03 DIAGNOSIS — I35.8 AORTIC SYSTOLIC MURMUR ON EXAMINATION: ICD-10-CM

## 2018-10-03 PROCEDURE — 99211 OFF/OP EST MAY X REQ PHY/QHP: CPT | Performed by: INTERNAL MEDICINE

## 2018-10-03 PROCEDURE — 1036F TOBACCO NON-USER: CPT | Performed by: STUDENT IN AN ORGANIZED HEALTH CARE EDUCATION/TRAINING PROGRAM

## 2018-10-03 PROCEDURE — G8399 PT W/DXA RESULTS DOCUMENT: HCPCS | Performed by: STUDENT IN AN ORGANIZED HEALTH CARE EDUCATION/TRAINING PROGRAM

## 2018-10-03 PROCEDURE — G8484 FLU IMMUNIZE NO ADMIN: HCPCS | Performed by: STUDENT IN AN ORGANIZED HEALTH CARE EDUCATION/TRAINING PROGRAM

## 2018-10-03 PROCEDURE — 1123F ACP DISCUSS/DSCN MKR DOCD: CPT | Performed by: STUDENT IN AN ORGANIZED HEALTH CARE EDUCATION/TRAINING PROGRAM

## 2018-10-03 PROCEDURE — G8427 DOCREV CUR MEDS BY ELIG CLIN: HCPCS | Performed by: STUDENT IN AN ORGANIZED HEALTH CARE EDUCATION/TRAINING PROGRAM

## 2018-10-03 PROCEDURE — 1101F PT FALLS ASSESS-DOCD LE1/YR: CPT | Performed by: STUDENT IN AN ORGANIZED HEALTH CARE EDUCATION/TRAINING PROGRAM

## 2018-10-03 PROCEDURE — 1090F PRES/ABSN URINE INCON ASSESS: CPT | Performed by: STUDENT IN AN ORGANIZED HEALTH CARE EDUCATION/TRAINING PROGRAM

## 2018-10-03 PROCEDURE — 4040F PNEUMOC VAC/ADMIN/RCVD: CPT | Performed by: STUDENT IN AN ORGANIZED HEALTH CARE EDUCATION/TRAINING PROGRAM

## 2018-10-03 PROCEDURE — 99214 OFFICE O/P EST MOD 30 MIN: CPT | Performed by: STUDENT IN AN ORGANIZED HEALTH CARE EDUCATION/TRAINING PROGRAM

## 2018-10-03 PROCEDURE — G8417 CALC BMI ABV UP PARAM F/U: HCPCS | Performed by: STUDENT IN AN ORGANIZED HEALTH CARE EDUCATION/TRAINING PROGRAM

## 2018-10-03 PROCEDURE — 3017F COLORECTAL CA SCREEN DOC REV: CPT | Performed by: STUDENT IN AN ORGANIZED HEALTH CARE EDUCATION/TRAINING PROGRAM

## 2018-10-03 RX ORDER — AMLODIPINE BESYLATE 5 MG/1
5 TABLET ORAL DAILY
Qty: 30 TABLET | Refills: 3 | Status: SHIPPED | OUTPATIENT
Start: 2018-10-03 | End: 2018-10-16 | Stop reason: SDUPTHER

## 2018-10-03 ASSESSMENT — PATIENT HEALTH QUESTIONNAIRE - PHQ9
SUM OF ALL RESPONSES TO PHQ9 QUESTIONS 1 & 2: 0
SUM OF ALL RESPONSES TO PHQ QUESTIONS 1-9: 0
1. LITTLE INTEREST OR PLEASURE IN DOING THINGS: 0
2. FEELING DOWN, DEPRESSED OR HOPELESS: 0
SUM OF ALL RESPONSES TO PHQ QUESTIONS 1-9: 0

## 2018-10-03 NOTE — PROGRESS NOTES
Diabetic visit information    BP Readings from Last 3 Encounters:   07/12/18 (!) 187/86   05/08/18 125/62   04/12/18 135/83       Hemoglobin A1C (%)   Date Value   08/30/2018 6.2 (H)   10/05/2017 5.9   06/07/2016 5.9     Microalb/Crt. Ratio (mcg/mg creat)   Date Value   08/30/2018 CANNOT BE CALCULATED     LDL Cholesterol (mg/dL)   Date Value   08/30/2018 57               Have you changed or started any medications since your last visit including any over-the-counter medicines, vitamins, or herbal medicines? no   Have you stopped taking any of your medications? Is so, why? -  no  Are you having any side effects from any of your medications? - no    Have you seen any other physician or provider since your last visit?  no   Have you had any other diagnostic tests since your last visit? yes    Have you been seen in the emergency room and/or had an admission in a hospital since we last saw you?  no     Have you had your annual diabetic retinal (eye) exam? No   (ensure copy of exam is in the chart)    Have you had your routine dental cleaning in the past 6 months? no    Do you have an active [employer] account? If not, what are your barriers? Yes    Patient Care Team:  Mae Noel MD as PCP - General (Internal Medicine)  Mae Noel MD as PCP - S Attributed Provider    Medical history Review  Past Medical, Family, and Social History reviewed and does contribute to the patient presenting condition.     Health Maintenance   Topic Date Due    DTaP/Tdap/Td vaccine (1 - Tdap) 08/02/1966    Shingles Vaccine (1 of 2 - 2 Dose Series) 08/02/1997    Pneumococcal low/med risk (2 of 2 - PPSV23) 06/07/2017    Breast cancer screen  12/01/2017    Diabetic retinal exam  08/15/2018    Flu vaccine (1) 09/01/2018    Diabetic foot exam  10/05/2018    A1C test (Diabetic or Prediabetic)  08/30/2019    Diabetic microalbuminuria test  08/30/2019    Lipid screen  08/30/2019    Potassium monitoring  08/30/2019    Creatinine
THE LUNGS TWICE A DAY 1 Inhaler 5    glucose blood VI test strips (ASCENSIA AUTODISC VI;ONE TOUCH ULTRA TEST VI) strip 1 each by In Vitro route daily Please Check sugar Daily 100 each 5    Spacer/Aero-Holding Chambers FRANK 1 Device by Does not apply route daily as needed. 1 Device 0    loratadine (CLARITIN) 10 MG tablet Take 10 mg by mouth daily.  clobetasol (TEMOVATE) 0.05 % ointment Apply  topically 2 times daily. Apply topically 2 times daily. No current facility-administered medications for this visit.         Social History   Substance Use Topics    Smoking status: Never Smoker    Smokeless tobacco: Never Used    Alcohol use No       No family history on file.   ________________________________________________________________________  Review of Systems:  CONSTITUTIONAL: Denies: fever, chills  PSYCH: Denies: anxiety, depression  ALLERGIES: Denies: urticaria  EYES: Denies: blurry vision, decreased vision, photophobia  ENT: Denies: sore throat, nasal congestion  CARDIOVASCULAR: Denies: chest pain, dyspnea on exertion  RESPIRATORY: Denies: cough, hemoptysis, shortness of breath  GI: Denies: Denies: abdominal pain, flank pain  : Denies: Denies: dysuria, frequency/urgency  NEURO: Denies: dizzy/vertigo, headache  MUSCULOSKELETAL: Denies: back pain, joint pain  SKIN: Complaining of: rash, itching  ________________________________________________________________________  Physical Exam:  Vitals:    10/03/18 1330   BP: (!) 160/82   Site: Right Upper Arm   Position: Sitting   Cuff Size: Medium Adult   Pulse: 90   Weight: 170 lb 6.4 oz (77.3 kg)     BP Readings from Last 3 Encounters:   10/03/18 (!) 160/82   07/12/18 (!) 187/86   05/08/18 125/62      General appearance - alert, well appearing, and in no distress  Mental status - alert, oriented to person, place, and time  Eyes - pupils equal and reactive, extraocular eye movements intact  Ears - bilateral TM's and external ear canals normal  Nose - normal

## 2018-10-10 ENCOUNTER — HOSPITAL ENCOUNTER (OUTPATIENT)
Dept: NON INVASIVE DIAGNOSTICS | Age: 73
Discharge: HOME | End: 2018-10-10
Payer: MEDICARE

## 2018-10-10 ENCOUNTER — HOSPITAL ENCOUNTER (OUTPATIENT)
Age: 73
Setting detail: SPECIMEN
Discharge: HOME | End: 2018-10-10
Payer: MEDICARE

## 2018-10-10 DIAGNOSIS — Z00.00 HEALTHCARE MAINTENANCE: ICD-10-CM

## 2018-10-10 DIAGNOSIS — I35.0 NONRHEUMATIC AORTIC VALVE STENOSIS: ICD-10-CM

## 2018-10-10 DIAGNOSIS — I35.8 AORTIC SYSTOLIC MURMUR ON EXAMINATION: ICD-10-CM

## 2018-10-10 LAB
ABSOLUTE EOS #: 0.48 K/UL (ref 0–0.44)
ABSOLUTE IMMATURE GRANULOCYTE: <0.03 K/UL (ref 0–0.3)
ABSOLUTE LYMPH #: 3.14 K/UL (ref 1.1–3.7)
ABSOLUTE MONO #: 0.58 K/UL (ref 0.1–1.2)
BASOPHILS # BLD: 1 % (ref 0–2)
BASOPHILS ABSOLUTE: 0.06 K/UL (ref 0–0.2)
DIFFERENTIAL TYPE: ABNORMAL
EOSINOPHILS RELATIVE PERCENT: 6 % (ref 1–4)
HCT VFR BLD CALC: 39.4 % (ref 36.3–47.1)
HEMOGLOBIN: 12.6 G/DL (ref 11.9–15.1)
IMMATURE GRANULOCYTES: 0 %
LV EF: 55 %
LVEF MODALITY: NORMAL
LYMPHOCYTES # BLD: 38 % (ref 24–43)
MCH RBC QN AUTO: 30.6 PG (ref 25.2–33.5)
MCHC RBC AUTO-ENTMCNC: 32 G/DL (ref 28.4–34.8)
MCV RBC AUTO: 95.6 FL (ref 82.6–102.9)
MONOCYTES # BLD: 7 % (ref 3–12)
NRBC AUTOMATED: 0 PER 100 WBC
PDW BLD-RTO: 13.1 % (ref 11.8–14.4)
PLATELET # BLD: 308 K/UL (ref 138–453)
PLATELET ESTIMATE: ABNORMAL
PMV BLD AUTO: 11.8 FL (ref 8.1–13.5)
RBC # BLD: 4.12 M/UL (ref 3.95–5.11)
RBC # BLD: ABNORMAL 10*6/UL
SEG NEUTROPHILS: 48 % (ref 36–65)
SEGMENTED NEUTROPHILS ABSOLUTE COUNT: 3.9 K/UL (ref 1.5–8.1)
WBC # BLD: 8.2 K/UL (ref 3.5–11.3)
WBC # BLD: ABNORMAL 10*3/UL

## 2018-10-10 PROCEDURE — 93306 TTE W/DOPPLER COMPLETE: CPT

## 2018-10-16 DIAGNOSIS — I10 ESSENTIAL HYPERTENSION: ICD-10-CM

## 2018-10-16 RX ORDER — AMLODIPINE BESYLATE 5 MG/1
5 TABLET ORAL DAILY
Qty: 60 TABLET | Refills: 3 | Status: SHIPPED | OUTPATIENT
Start: 2018-10-16 | End: 2018-11-05 | Stop reason: SDUPTHER

## 2018-11-05 ENCOUNTER — OFFICE VISIT (OUTPATIENT)
Dept: INTERNAL MEDICINE | Age: 73
End: 2018-11-05
Payer: MEDICARE

## 2018-11-05 VITALS
SYSTOLIC BLOOD PRESSURE: 160 MMHG | WEIGHT: 173.6 LBS | BODY MASS INDEX: 34.08 KG/M2 | DIASTOLIC BLOOD PRESSURE: 92 MMHG | HEART RATE: 90 BPM | HEIGHT: 60 IN

## 2018-11-05 DIAGNOSIS — E11.65 TYPE 2 DIABETES MELLITUS WITH HYPERGLYCEMIA, WITHOUT LONG-TERM CURRENT USE OF INSULIN (HCC): Primary | ICD-10-CM

## 2018-11-05 DIAGNOSIS — K21.9 GASTROESOPHAGEAL REFLUX DISEASE WITHOUT ESOPHAGITIS: ICD-10-CM

## 2018-11-05 DIAGNOSIS — I50.30 DIASTOLIC HEART FAILURE, UNSPECIFIED HF CHRONICITY (HCC): ICD-10-CM

## 2018-11-05 DIAGNOSIS — I10 ESSENTIAL HYPERTENSION: ICD-10-CM

## 2018-11-05 PROCEDURE — G8427 DOCREV CUR MEDS BY ELIG CLIN: HCPCS | Performed by: INTERNAL MEDICINE

## 2018-11-05 PROCEDURE — G8484 FLU IMMUNIZE NO ADMIN: HCPCS | Performed by: INTERNAL MEDICINE

## 2018-11-05 PROCEDURE — 99211 OFF/OP EST MAY X REQ PHY/QHP: CPT | Performed by: INTERNAL MEDICINE

## 2018-11-05 PROCEDURE — 3017F COLORECTAL CA SCREEN DOC REV: CPT | Performed by: INTERNAL MEDICINE

## 2018-11-05 PROCEDURE — 1101F PT FALLS ASSESS-DOCD LE1/YR: CPT | Performed by: INTERNAL MEDICINE

## 2018-11-05 PROCEDURE — 4040F PNEUMOC VAC/ADMIN/RCVD: CPT | Performed by: INTERNAL MEDICINE

## 2018-11-05 PROCEDURE — 99214 OFFICE O/P EST MOD 30 MIN: CPT | Performed by: INTERNAL MEDICINE

## 2018-11-05 PROCEDURE — 1090F PRES/ABSN URINE INCON ASSESS: CPT | Performed by: INTERNAL MEDICINE

## 2018-11-05 PROCEDURE — 1036F TOBACCO NON-USER: CPT | Performed by: INTERNAL MEDICINE

## 2018-11-05 PROCEDURE — 3044F HG A1C LEVEL LT 7.0%: CPT | Performed by: INTERNAL MEDICINE

## 2018-11-05 PROCEDURE — 1123F ACP DISCUSS/DSCN MKR DOCD: CPT | Performed by: INTERNAL MEDICINE

## 2018-11-05 PROCEDURE — G8417 CALC BMI ABV UP PARAM F/U: HCPCS | Performed by: INTERNAL MEDICINE

## 2018-11-05 PROCEDURE — G8399 PT W/DXA RESULTS DOCUMENT: HCPCS | Performed by: INTERNAL MEDICINE

## 2018-11-05 PROCEDURE — 2022F DILAT RTA XM EVC RTNOPTHY: CPT | Performed by: INTERNAL MEDICINE

## 2018-11-05 RX ORDER — AMLODIPINE BESYLATE 10 MG/1
10 TABLET ORAL DAILY
Qty: 30 TABLET | Refills: 5 | Status: SHIPPED | OUTPATIENT
Start: 2018-11-05 | End: 2018-11-26 | Stop reason: SDUPTHER

## 2018-11-05 RX ORDER — HYDROCHLOROTHIAZIDE 12.5 MG/1
12.5 CAPSULE, GELATIN COATED ORAL DAILY
Qty: 30 CAPSULE | Refills: 3 | Status: SHIPPED | OUTPATIENT
Start: 2018-11-05 | End: 2019-03-04 | Stop reason: SDUPTHER

## 2018-11-05 RX ORDER — AMLODIPINE BESYLATE 10 MG/1
10 TABLET ORAL DAILY
Qty: 30 TABLET | Refills: 5 | Status: SHIPPED | OUTPATIENT
Start: 2018-11-05 | End: 2018-11-05 | Stop reason: SDUPTHER

## 2018-11-05 RX ORDER — CETIRIZINE HYDROCHLORIDE 10 MG/1
10 TABLET ORAL DAILY
COMMUNITY

## 2018-11-05 RX ORDER — HYDROCHLOROTHIAZIDE 12.5 MG/1
12.5 CAPSULE, GELATIN COATED ORAL DAILY
Qty: 30 CAPSULE | Refills: 3 | Status: SHIPPED | OUTPATIENT
Start: 2018-11-05 | End: 2018-11-05 | Stop reason: SDUPTHER

## 2018-11-05 ASSESSMENT — ENCOUNTER SYMPTOMS
ALLERGIC/IMMUNOLOGIC NEGATIVE: 1
EYES NEGATIVE: 1
RESPIRATORY NEGATIVE: 1
GASTROINTESTINAL NEGATIVE: 1

## 2018-11-05 NOTE — PROGRESS NOTES
[facility]   Progress Note      Visit Information    Have you changed or started any medications since your last visit including any over-the-counter medicines, vitamins, or herbal medicines? yes - calcium with vitamin D 600 mg, Vitamin B-12 stress complex and Tylenol arthritis  Are you having any side effects from any of your medications? -  No  Have you stopped taking any of your medications? Is so, why? -  no    Have you seen any other physician or provider since your last visit? No  Have you had any other diagnostic tests since your last visit? Yes - Records Obtained  Have you been seen in the emergency room and/or had an admission to a hospital since we last saw you? No  Have you had your routine dental cleaning in the past 6 months? yes -     Have you activated your [employer] account? If not, what are your barriers?  Yes     Patient Care Team:  Deno Miles MD as PCP - General (Internal Medicine)  Deon Miles MD as PCP - S Attributed Provider    Medical History Review  Past Medical, Family, and Social History reviewed and does contribute to the patient presenting condition    Health Maintenance   Topic Date Due    DTaP/Tdap/Td vaccine (1 - Tdap) 08/02/1966    Shingles Vaccine (1 of 2 - 2 Dose Series) 08/02/1997    Pneumococcal low/med risk (2 of 2 - PPSV23) 06/07/2017    Diabetic retinal exam  08/15/2018    Diabetic foot exam  10/05/2018    A1C test (Diabetic or Prediabetic)  08/30/2019    Diabetic microalbuminuria test  08/30/2019    Lipid screen  08/30/2019    Breast cancer screen  05/01/2020    Colon cancer screen colonoscopy  05/08/2023    DEXA (modify frequency per FRAX score)  Addressed    Flu vaccine  Completed    Hepatitis C screen  Addressed       Date of patient's visit: 11/5/2018  Patient's Name:  Mariela Garcia                   YOB: 1945        PCP:  Deon Miles MD    Mariela Garcia is a 72 y.o. female who presents for   Chief Complaint   Patient presents with    Diabetes    Hypertension    and follow up of chronic medical problems. Patient Active Problem List   Diagnosis    Post hysterectomy menopause    Asthma    Stressful life event affecting family    GERD (gastroesophageal reflux disease)    DM2 (diabetes mellitus, type 2) ([address]    HBP (high blood pressure)    Mitral regurgitation    Vulvar dystrophy    Tubular adenoma of colon    Diverticulosis of colon    Hypermetropia    Nuclear senile cataract    Presbyopia    Regular astigmatism       HISTORY OF PRESENT ILLNESS:    History was obtained from the patient. Treatment Adherence:   Medication compliance:  compliant most of the time  Diet compliance:  compliant most of the time  Weight trend: stable  Current exercise: no regular exercise  Barriers: none    Diabetes Mellitus Type 2: Current symptoms/problems include none. Home blood sugar records: patient does not test  Any episodes of hypoglycemia? no  Eye exam current (within one year): yes  Tobacco history: She  reports that she has never smoked. She has never used smokeless tobacco.   Daily Aspirin? Yes    Hypertension:  Home blood pressure monitoring: No.  She is adherent to a low sodium diet. Patient denies chest pain and shortness of breath. Antihypertensive medication side effects: no medication side effects noted. Use of agents associated with hypertension: none. Hyperlipidemia:  No new myalgias or GI upset on fenofibrate (Tricor, Trilipix) and pravastatin (Pravachol).        Lab Results   Component Value Date    LABA1C 6.2 (H) 08/30/2018    LABA1C 5.9 10/05/2017    LABA1C 5.9 06/07/2016     Lab Results   Component Value Date    LABMICR CANNOT BE CALCULATED 08/30/2018    CREATININE 0.60 08/30/2018     Lab Results   Component Value Date    ALT 17 08/30/2018    AST 18 08/30/2018     Lab Results   Component Value Date    CHOL 152 08/30/2018    TRIG 294 (H) 08/30/2018    HDL 36 (L)

## 2018-11-12 ENCOUNTER — OFFICE VISIT (OUTPATIENT)
Dept: INTERNAL MEDICINE | Age: 73
End: 2018-11-12
Payer: MEDICARE

## 2018-11-12 VITALS
HEART RATE: 83 BPM | SYSTOLIC BLOOD PRESSURE: 142 MMHG | HEIGHT: 60 IN | DIASTOLIC BLOOD PRESSURE: 72 MMHG | WEIGHT: 169.8 LBS | BODY MASS INDEX: 33.34 KG/M2

## 2018-11-12 DIAGNOSIS — Z00.00 ROUTINE GENERAL MEDICAL EXAMINATION AT A HEALTH CARE FACILITY: Primary | ICD-10-CM

## 2018-11-12 PROCEDURE — 4040F PNEUMOC VAC/ADMIN/RCVD: CPT | Performed by: INTERNAL MEDICINE

## 2018-11-12 PROCEDURE — G8484 FLU IMMUNIZE NO ADMIN: HCPCS | Performed by: INTERNAL MEDICINE

## 2018-11-12 PROCEDURE — G0438 PPPS, INITIAL VISIT: HCPCS | Performed by: INTERNAL MEDICINE

## 2018-11-12 PROCEDURE — 99211 OFF/OP EST MAY X REQ PHY/QHP: CPT | Performed by: INTERNAL MEDICINE

## 2018-11-12 RX ORDER — PNEUMOCOCCAL VACCINE POLYVALENT 25; 25; 25; 25; 25; 25; 25; 25; 25; 25; 25; 25; 25; 25; 25; 25; 25; 25; 25; 25; 25; 25; 25 UG/.5ML; UG/.5ML; UG/.5ML; UG/.5ML; UG/.5ML; UG/.5ML; UG/.5ML; UG/.5ML; UG/.5ML; UG/.5ML; UG/.5ML; UG/.5ML; UG/.5ML; UG/.5ML; UG/.5ML; UG/.5ML; UG/.5ML; UG/.5ML; UG/.5ML; UG/.5ML; UG/.5ML; UG/.5ML; UG/.5ML
INJECTION, SOLUTION INTRAMUSCULAR; SUBCUTANEOUS
Refills: 0 | COMMUNITY
Start: 2018-10-22 | End: 2019-06-10 | Stop reason: ALTCHOICE

## 2018-11-12 RX ORDER — ZOSTER VACCINE RECOMBINANT, ADJUVANTED 50 MCG/0.5
KIT INTRAMUSCULAR
Refills: 0 | COMMUNITY
Start: 2018-11-05 | End: 2018-11-12

## 2018-11-12 RX ORDER — INFLUENZA A VIRUS A/MICHIGAN/45/2015 X-275 (H1N1) ANTIGEN (FORMALDEHYDE INACTIVATED), INFLUENZA A VIRUS A/SINGAPORE/INFIMH-16-0019/2016 IVR-186 (H3N2) ANTIGEN (FORMALDEHYDE INACTIVATED), AND INFLUENZA B VIRUS B/MARYLAND/15/2016 BX-69A (A B/COLORADO/6/2017-LIKE VIRUS) ANTIGEN (FORMALDEHYDE INACTIVATED) 60; 60; 60 UG/.5ML; UG/.5ML; UG/.5ML
INJECTION, SUSPENSION INTRAMUSCULAR
Refills: 0 | COMMUNITY
Start: 2018-10-13 | End: 2019-06-10 | Stop reason: ALTCHOICE

## 2018-11-12 ASSESSMENT — ANXIETY QUESTIONNAIRES: GAD7 TOTAL SCORE: 0

## 2018-11-12 ASSESSMENT — PATIENT HEALTH QUESTIONNAIRE - PHQ9
SUM OF ALL RESPONSES TO PHQ QUESTIONS 1-9: 0
SUM OF ALL RESPONSES TO PHQ QUESTIONS 1-9: 0

## 2018-11-12 NOTE — PROGRESS NOTES
BP: (!) 155/83   Site: Right Upper Arm   Position: Sitting   Cuff Size: Medium Adult   Pulse: 83   Weight: 169 lb 12.8 oz (77 kg)   Height: 5' (1.524 m)     Body mass index is 33.16 kg/m². General Appearance: alert and oriented to person, place and time, well developed and well- nourished, in no acute distress  Skin: warm and dry, no rash or erythema  Head: normocephalic and atraumatic  Eyes: pupils equal, round, and reactive to light, extraocular eye movements intact, conjunctivae normal  Pulmonary/Chest: clear to auscultation bilaterally- no wheezes, rales or rhonchi, normal air movement, no respiratory distress  Cardiovascular: normal rate, regular rhythm, normal S1 and S2, no murmurs, rubs, clicks, or gallops, distal pulses intact, no carotid bruits  Extremities: no cyanosis, clubbing or edema  Musculoskeletal: normal range of motion, no joint swelling, deformity or tenderness  Neurologic: reflexes normal and symmetric, no cranial nerve deficit, gait, coordination and speech normal    Patient's complete Health Risk Assessment and screening values have been reviewed and are found in Flowsheets. The following problems were reviewed today and where indicated follow up appointments were made and/or referrals ordered. Positive Risk Factor Screenings with Interventions:     Cognitive: Words recalled: 0 Words Recalled  Clock Drawing Test (CDT) Score: Normal  Total Score Interpretation: Positive Mini-Cog  Did the patient refuse to take the cognition test?: No  Cognitive Impairment Interventions:  · Patient advised to follow-up in this office for further evaluation and treatment within 4 month(s)    Health Habits/Nutrition:     Body mass index is 33.16 kg/m².   Health Habits/Nutrition Interventions:  · Nutritional issues:  educational materials for healthy, well-balanced diet provided, patient agrees to work toward a weight loss goal of 5 pounds over the next 1 month(s) using the following plan: low carbohydrate diet    Personalized Preventive Plan   Current Health Maintenance Status  Immunization History   Administered Date(s) Administered    Influenza Virus Vaccine 10/15/2013, 10/02/2017    Influenza, Manuel Simpler, 3 yrs and older, IM, PF (Fluzone 3 yrs and older or Afluria 5 yrs and older) 10/11/2016    Pneumococcal 13-valent Conjugate ([id number]) 06/07/2016    Pneumococcal Polysaccharide ([id number]) 10/22/2018    Tdap (Boostrix, Adacel) 11/06/2017    Zoster Subunit (Shingrix) 09/06/2018, 11/05/2018        Health Maintenance   Topic Date Due    Diabetic retinal exam  08/15/2018    Diabetic foot exam  10/05/2018    A1C test (Diabetic or Prediabetic)  08/30/2019    Diabetic microalbuminuria test  08/30/2019    Lipid screen  08/30/2019    Potassium monitoring  08/30/2019    Creatinine monitoring  08/30/2019    Breast cancer screen  05/01/2020    Colon cancer screen colonoscopy  05/08/2023    DTaP/Tdap/Td vaccine (2 - Td) 11/06/2027    DEXA (modify frequency per FRAX score)  Addressed    Flu vaccine  Completed    Pneumococcal low/med risk  Completed    Shingles Vaccine  Completed    Hepatitis C screen  Addressed     Recommendations for Preventive Services Due: see orders and patient instructions/AVS.  . Recommended screening schedule for the next 5-10 years is provided to the patient in written form: see Patient Instructions/AVS.    Advanced Care Planning: Discussed the patients choices for care and treatment in case of a health event that adversely affects decision-making abilities. Also discussed the patients long-term treatment options. Reviewed with the patient the [address] [place] [address] & [facility] Declaration forms  Reviewed the process of designating a competent adult as an Agent (or -in-fact) that could take make health care decisions for the patient if incompetent.  Patient was asked to complete the declaration forms, either acknowledge the forms by a public notary or an

## 2018-11-12 NOTE — PATIENT INSTRUCTIONS
you wrote in your living will. · Your state may offer an online registry. This is a place where you can store your living will online so the doctors and nurses who need to treat you can find it right away. What should you think about when creating a living will? Talk about your end-of-life wishes with your family members and your doctor. Let them know what you want. That way the people making decisions for you won't be surprised by your choices. Think about these questions as you make your living will:  · Do you know enough about life support methods that might be used? If not, talk to your doctor so you know what might be done if you can't breathe on your own, your heart stops, or you're unable to swallow. · What things would you still want to be able to do after you receive life-support methods? Would you want to be able to walk? To speak? To eat on your own? To live without the help of machines? · If you have a choice, where do you want to be cared for? In your home? At a hospital or nursing home? · Do you want certain Jehovah's witness practices performed if you become very ill? · If you have a choice at the end of your life, where would you prefer to die? At home? In a hospital or nursing home? Somewhere else? · Would you prefer to be buried or cremated? · Do you want your organs to be donated after you die? What should you do with your living will? · Make sure that your family members and your health care agent have copies of your living will. · Give your doctor a copy of your living will to keep in your medical record. If you have more than one doctor, make sure that each one has a copy. · You may want to put a copy of your living will where it can be easily found. Where can you learn more? Go to https://chpebaltazarewjose enrique.[employer]. org and sign in to your [employer] account. Enter X516 in the [employer] box to learn more about \"Learning About Living Lia Evans. \"     If you do not have an medical condition or this instruction, always ask your healthcare professional. [place] [address] any warranty or liability for your use of this information. Personalized Preventive Plan for Kanika Goodman - 11/12/2018  Medicare offers a range of preventive health benefits. Some of the tests and screenings are paid in full while other may be subject to a deductible, co-insurance, and/or copay. Some of these benefits include a comprehensive review of your medical history including lifestyle, illnesses that may run in your family, and various assessments and screenings as appropriate. After reviewing your medical record and screening and assessments performed today your provider may have ordered immunizations, labs, imaging, and/or referrals for you. A list of these orders (if applicable) as well as your Preventive Care list are included within your After Visit Summary for your review. Other Preventive Recommendations:    · A preventive eye exam performed by an eye specialist is recommended every 1-2 years to screen for glaucoma; cataracts, macular degeneration, and other eye disorders. · A preventive dental visit is recommended every 6 months. · Try to get at least 150 minutes of exercise per week or 10,000 steps per day on a pedometer . · Order or download the FREE \"Exercise & Physical Activity: Your Everyday Guide\" from [employer]. Call 4-541.813.2578 or search [employer] online. · You need 2047-3618 mg of calcium and 1310-4505 IU of vitamin D per day. It is possible to meet your calcium requirement with diet alone, but a vitamin D supplement is usually necessary to meet this goal.  · When exposed to the sun, use a sunscreen that protects against both UVA and UVB radiation with an SPF of 30 or greater. Reapply every 2 to 3 hours or after sweating, drying off with a towel, or swimming. · Always wear a seat belt when traveling in a car. Always wear a helmet when riding a bicycle or motorcycle. Follow-up appointment scheduled for 2/11/19, AVS given to patient.     True Reining

## 2018-11-19 RX ORDER — RALOXIFENE HYDROCHLORIDE 60 MG/1
TABLET, FILM COATED ORAL
Qty: 90 TABLET | Refills: 5 | Status: SHIPPED | OUTPATIENT
Start: 2018-11-19 | End: 2019-03-04 | Stop reason: ALTCHOICE

## 2018-11-26 DIAGNOSIS — I10 ESSENTIAL HYPERTENSION: ICD-10-CM

## 2018-11-26 RX ORDER — AMLODIPINE BESYLATE 10 MG/1
10 TABLET ORAL DAILY
Qty: 30 TABLET | Refills: 5 | Status: SHIPPED | OUTPATIENT
Start: 2018-11-26 | End: 2019-06-10 | Stop reason: SDUPTHER

## 2018-12-23 ENCOUNTER — PATIENT MESSAGE (OUTPATIENT)
Dept: INTERNAL MEDICINE | Age: 73
End: 2018-12-23

## 2018-12-23 DIAGNOSIS — E78.5 DYSLIPIDEMIA: ICD-10-CM

## 2018-12-24 RX ORDER — FENOFIBRATE 48 MG/1
48 TABLET, COATED ORAL DAILY
Qty: 90 TABLET | Refills: 1 | Status: SHIPPED | OUTPATIENT
Start: 2018-12-24 | End: 2019-06-10 | Stop reason: SDUPTHER

## 2019-01-14 DIAGNOSIS — E11.65 TYPE 2 DIABETES MELLITUS WITH HYPERGLYCEMIA, WITHOUT LONG-TERM CURRENT USE OF INSULIN (HCC): Primary | ICD-10-CM

## 2019-01-14 RX ORDER — BLOOD-GLUCOSE METER
1 KIT MISCELLANEOUS DAILY
Qty: 1 KIT | Refills: 0 | Status: SHIPPED | OUTPATIENT
Start: 2019-01-14

## 2019-02-07 ENCOUNTER — PATIENT MESSAGE (OUTPATIENT)
Dept: INTERNAL MEDICINE | Age: 74
End: 2019-02-07

## 2019-02-07 DIAGNOSIS — E11.65 TYPE 2 DIABETES MELLITUS WITH HYPERGLYCEMIA, WITHOUT LONG-TERM CURRENT USE OF INSULIN (HCC): ICD-10-CM

## 2019-03-04 ENCOUNTER — OFFICE VISIT (OUTPATIENT)
Dept: INTERNAL MEDICINE | Age: 74
End: 2019-03-04
Payer: MEDICARE

## 2019-03-04 VITALS
DIASTOLIC BLOOD PRESSURE: 76 MMHG | HEART RATE: 83 BPM | SYSTOLIC BLOOD PRESSURE: 158 MMHG | HEIGHT: 60 IN | BODY MASS INDEX: 34.24 KG/M2 | WEIGHT: 174.4 LBS

## 2019-03-04 DIAGNOSIS — R05.9 COUGH: ICD-10-CM

## 2019-03-04 DIAGNOSIS — K21.9 GASTROESOPHAGEAL REFLUX DISEASE WITHOUT ESOPHAGITIS: ICD-10-CM

## 2019-03-04 DIAGNOSIS — E11.65 TYPE 2 DIABETES MELLITUS WITH HYPERGLYCEMIA, WITHOUT LONG-TERM CURRENT USE OF INSULIN (HCC): Primary | ICD-10-CM

## 2019-03-04 DIAGNOSIS — J45.40 MODERATE PERSISTENT ASTHMA WITHOUT COMPLICATION: ICD-10-CM

## 2019-03-04 DIAGNOSIS — F51.01 PRIMARY INSOMNIA: ICD-10-CM

## 2019-03-04 LAB — HBA1C MFR BLD: 6.1 %

## 2019-03-04 PROCEDURE — 3044F HG A1C LEVEL LT 7.0%: CPT | Performed by: INTERNAL MEDICINE

## 2019-03-04 PROCEDURE — G8427 DOCREV CUR MEDS BY ELIG CLIN: HCPCS | Performed by: INTERNAL MEDICINE

## 2019-03-04 PROCEDURE — 2022F DILAT RTA XM EVC RTNOPTHY: CPT | Performed by: INTERNAL MEDICINE

## 2019-03-04 PROCEDURE — 83036 HEMOGLOBIN GLYCOSYLATED A1C: CPT | Performed by: INTERNAL MEDICINE

## 2019-03-04 PROCEDURE — 3017F COLORECTAL CA SCREEN DOC REV: CPT | Performed by: INTERNAL MEDICINE

## 2019-03-04 PROCEDURE — 99214 OFFICE O/P EST MOD 30 MIN: CPT | Performed by: INTERNAL MEDICINE

## 2019-03-04 PROCEDURE — G8399 PT W/DXA RESULTS DOCUMENT: HCPCS | Performed by: INTERNAL MEDICINE

## 2019-03-04 PROCEDURE — 1090F PRES/ABSN URINE INCON ASSESS: CPT | Performed by: INTERNAL MEDICINE

## 2019-03-04 PROCEDURE — G8417 CALC BMI ABV UP PARAM F/U: HCPCS | Performed by: INTERNAL MEDICINE

## 2019-03-04 PROCEDURE — 4040F PNEUMOC VAC/ADMIN/RCVD: CPT | Performed by: INTERNAL MEDICINE

## 2019-03-04 PROCEDURE — 99211 OFF/OP EST MAY X REQ PHY/QHP: CPT | Performed by: INTERNAL MEDICINE

## 2019-03-04 PROCEDURE — G8484 FLU IMMUNIZE NO ADMIN: HCPCS | Performed by: INTERNAL MEDICINE

## 2019-03-04 PROCEDURE — 1123F ACP DISCUSS/DSCN MKR DOCD: CPT | Performed by: INTERNAL MEDICINE

## 2019-03-04 PROCEDURE — 1101F PT FALLS ASSESS-DOCD LE1/YR: CPT | Performed by: INTERNAL MEDICINE

## 2019-03-04 PROCEDURE — 1036F TOBACCO NON-USER: CPT | Performed by: INTERNAL MEDICINE

## 2019-03-04 RX ORDER — HYDROCHLOROTHIAZIDE 12.5 MG/1
12.5 CAPSULE, GELATIN COATED ORAL DAILY
Qty: 90 CAPSULE | Refills: 3 | Status: SHIPPED | OUTPATIENT
Start: 2019-03-04 | End: 2019-06-10 | Stop reason: SDUPTHER

## 2019-03-04 RX ORDER — RANITIDINE 150 MG/1
TABLET ORAL
Qty: 180 TABLET | Refills: 5 | Status: SHIPPED | OUTPATIENT
Start: 2019-03-04 | End: 2019-06-10 | Stop reason: SDUPTHER

## 2019-03-04 RX ORDER — BUDESONIDE AND FORMOTEROL FUMARATE DIHYDRATE 160; 4.5 UG/1; UG/1
AEROSOL RESPIRATORY (INHALATION)
Qty: 3 INHALER | Refills: 5 | Status: SHIPPED | OUTPATIENT
Start: 2019-03-04 | End: 2019-06-10 | Stop reason: SDUPTHER

## 2019-03-04 RX ORDER — FLUTICASONE PROPIONATE 110 UG/1
2 AEROSOL, METERED RESPIRATORY (INHALATION) 2 TIMES DAILY
Qty: 1 INHALER | Refills: 3 | Status: SHIPPED | OUTPATIENT
Start: 2019-03-04 | End: 2019-06-10 | Stop reason: SDUPTHER

## 2019-03-04 RX ORDER — MONTELUKAST SODIUM 10 MG/1
TABLET ORAL
Qty: 90 TABLET | Refills: 5 | Status: SHIPPED | OUTPATIENT
Start: 2019-03-04 | End: 2019-06-10 | Stop reason: SDUPTHER

## 2019-03-04 ASSESSMENT — ENCOUNTER SYMPTOMS
COUGH: 1
EYES NEGATIVE: 1
ALLERGIC/IMMUNOLOGIC NEGATIVE: 1
GASTROINTESTINAL NEGATIVE: 1

## 2019-03-04 ASSESSMENT — PATIENT HEALTH QUESTIONNAIRE - PHQ9
2. FEELING DOWN, DEPRESSED OR HOPELESS: 0
SUM OF ALL RESPONSES TO PHQ QUESTIONS 1-9: 0
1. LITTLE INTEREST OR PLEASURE IN DOING THINGS: 0
SUM OF ALL RESPONSES TO PHQ9 QUESTIONS 1 & 2: 0
SUM OF ALL RESPONSES TO PHQ QUESTIONS 1-9: 0

## 2019-05-08 ENCOUNTER — OFFICE VISIT (OUTPATIENT)
Dept: FAMILY MEDICINE CLINIC | Age: 74
End: 2019-05-08
Payer: MEDICARE

## 2019-05-08 VITALS
HEART RATE: 83 BPM | WEIGHT: 176 LBS | OXYGEN SATURATION: 96 % | RESPIRATION RATE: 20 BRPM | BODY MASS INDEX: 34.37 KG/M2 | DIASTOLIC BLOOD PRESSURE: 80 MMHG | TEMPERATURE: 98.3 F | SYSTOLIC BLOOD PRESSURE: 138 MMHG

## 2019-05-08 DIAGNOSIS — J45.40 MODERATE PERSISTENT ASTHMATIC BRONCHITIS WITHOUT COMPLICATION: Primary | ICD-10-CM

## 2019-05-08 DIAGNOSIS — R06.2 WHEEZING: ICD-10-CM

## 2019-05-08 DIAGNOSIS — R05.8 COUGH WITH SPUTUM: ICD-10-CM

## 2019-05-08 PROCEDURE — 99213 OFFICE O/P EST LOW 20 MIN: CPT | Performed by: NURSE PRACTITIONER

## 2019-05-08 PROCEDURE — 1123F ACP DISCUSS/DSCN MKR DOCD: CPT | Performed by: NURSE PRACTITIONER

## 2019-05-08 PROCEDURE — G8427 DOCREV CUR MEDS BY ELIG CLIN: HCPCS | Performed by: NURSE PRACTITIONER

## 2019-05-08 PROCEDURE — G8417 CALC BMI ABV UP PARAM F/U: HCPCS | Performed by: NURSE PRACTITIONER

## 2019-05-08 PROCEDURE — 1036F TOBACCO NON-USER: CPT | Performed by: NURSE PRACTITIONER

## 2019-05-08 PROCEDURE — 3017F COLORECTAL CA SCREEN DOC REV: CPT | Performed by: NURSE PRACTITIONER

## 2019-05-08 PROCEDURE — 4040F PNEUMOC VAC/ADMIN/RCVD: CPT | Performed by: NURSE PRACTITIONER

## 2019-05-08 PROCEDURE — G8399 PT W/DXA RESULTS DOCUMENT: HCPCS | Performed by: NURSE PRACTITIONER

## 2019-05-08 PROCEDURE — 1090F PRES/ABSN URINE INCON ASSESS: CPT | Performed by: NURSE PRACTITIONER

## 2019-05-08 RX ORDER — AZITHROMYCIN 250 MG/1
TABLET, FILM COATED ORAL
Qty: 1 PACKET | Refills: 0 | Status: SHIPPED | OUTPATIENT
Start: 2019-05-08 | End: 2019-06-10 | Stop reason: ALTCHOICE

## 2019-05-08 RX ORDER — PREDNISONE 20 MG/1
20 TABLET ORAL 2 TIMES DAILY
Qty: 10 TABLET | Refills: 0 | Status: SHIPPED | OUTPATIENT
Start: 2019-05-08 | End: 2019-05-13

## 2019-05-08 RX ORDER — FLUTICASONE PROPIONATE 50 MCG
2 SPRAY, SUSPENSION (ML) NASAL DAILY
Qty: 1 BOTTLE | Refills: 0 | Status: SHIPPED | OUTPATIENT
Start: 2019-05-08 | End: 2019-06-10 | Stop reason: SDUPTHER

## 2019-05-08 RX ORDER — GUAIFENESIN 600 MG/1
600 TABLET, EXTENDED RELEASE ORAL 2 TIMES DAILY
Qty: 30 TABLET | Refills: 0 | Status: SHIPPED | OUTPATIENT
Start: 2019-05-08 | End: 2019-05-23

## 2019-05-08 ASSESSMENT — ENCOUNTER SYMPTOMS
SHORTNESS OF BREATH: 1
SORE THROAT: 0
HEMOPTYSIS: 0
HEARTBURN: 0
WHEEZING: 1
RHINORRHEA: 0
COUGH: 1

## 2019-05-08 NOTE — PROGRESS NOTES
[address] [address] [facility]  [address]  [place] [address]  Dept: [phone]  Dept Fax: [phone]    Dao Sepulveda is a 72 y.o. female who presents to the urgent care today for her medical conditions/complaints as notedbelow. Dao Sepulveda is c/o of Cough (been going on since begining of janurary. productive at times- yellow); Fatigue; and Wheezing (and SOB at times. pt has ashtma)      HPI:     19-year-old female patient with a history of asthma presents for complaints of gradually worsening cough that is frequently productive of yellow. She has had postnasal drip but typically clear. Denies any sinus pressure or ear pain. Has not had any fevers. She saw her family doctor for the symptoms are same month and a half ago and was told at that time it was related to her asthma and viral, she's been taking her Singulair, and Symbicort. Occasionally she'll have wheezes but has not had to use her home nebulizers or inhaler. Cough is keeping her up at night, causing fatigue. She feels the cough is getting more prevalent with time. No history of pneumonia she feels well otherwise. Symptoms for approximately 3 months now. Sob at times. Wheezing last night. Tired of coughing. Cough   This is a new problem. The current episode started more than 1 month ago. The problem has been gradually worsening. The problem occurs every few minutes. The cough is productive of sputum and productive of purulent sputum. Associated symptoms include nasal congestion, postnasal drip, shortness of breath and wheezing. Pertinent negatives include no chest pain, chills, ear congestion, ear pain, heartburn, hemoptysis, myalgias, rhinorrhea, sore throat, sweats or weight loss. Nothing aggravates the symptoms. She has tried a beta-agonist inhaler for the symptoms. The treatment provided no relief. Her past medical history is significant for asthma and bronchitis.  There is no history of bronchiectasis, COPD, emphysema, environmental allergies or pneumonia. Wheezing    Associated symptoms include shortness of breath. Pertinent negatives include no chest pain, chills, ear pain, hemoptysis, rhinorrhea or sore throat. Her past medical history is significant for asthma. There is no history of COPD or pneumonia. Past Medical History:   Diagnosis Date    Asthma     Diverticulosis of colon     GERD (gastroesophageal reflux disease)     Hypertension     Tubular adenoma of colon 05/08/2018    Type II or unspecified type diabetes mellitus without mention of complication, not stated as uncontrolled         Current Outpatient Medications   Medication Sig Dispense Refill    predniSONE (DELTASONE) 20 MG tablet Take 1 tablet by mouth 2 times daily for 5 days 10 tablet 0    azithromycin (ZITHROMAX Z-STACEY) 250 MG tablet Take 2 tablets (500 mg) on Day 1, and then take 1 tablet (250 mg) on days 2 through 5. 1 packet 0    fluticasone (FLONASE) 50 MCG/ACT nasal spray 2 sprays by Nasal route daily 1 Bottle 0    guaiFENesin (MUCINEX) 600 MG extended release tablet Take 1 tablet by mouth 2 times daily for 15 days 30 tablet 0    metFORMIN (GLUCOPHAGE) 500 MG tablet TAKE 1 TABLET BY MOUTH 2  TIMES A  tablet 3    Melatonin 5 MG CAPS Take 1 capsule by mouth nightly.  30 capsule 5    budesonide-formoterol (SYMBICORT) 160-4.5 MCG/ACT AERO INHALE 2 PUFFS INTO THE LUNGS TWICE A DAY 3 Inhaler 5    ranitidine (ZANTAC) 150 MG tablet TAKE 1 TABLET BY MOUTH  TWICE A  tablet 5    hydrochlorothiazide (MICROZIDE) 12.5 MG capsule Take 1 capsule by mouth daily 90 capsule 3    montelukast (SINGULAIR) 10 MG tablet TAKE 1 TABLET BY MOUTH  DAILY 90 tablet 5    fenofibrate (TRICOR) 48 MG tablet Take 1 tablet by mouth daily 90 tablet 1    amLODIPine (NORVASC) 10 MG tablet Take 1 tablet by mouth daily 30 tablet 5    Calcium Carb-Cholecalciferol 600-1000 MG-UNIT CAPS Take by mouth      pravastatin reviewed and are negative. 14 systems reviewed and negative except as listed in HPI. Objective:     Physical Exam   Constitutional: She is oriented to person, place, and time. She appears well-developed and well-nourished. No distress. nontoxic   HENT:   Head: Normocephalic and atraumatic. Right Ear: External ear normal.   Left Ear: External ear normal.   Mouth/Throat: No oropharyngeal exudate. + post nasal drip  Swallows without difficulty  Clear mucous aleksandr nares  aleksandr tm pearly grey with good cone light   Eyes: Pupils are equal, round, and reactive to light. Conjunctivae and EOM are normal. Right eye exhibits no discharge. Left eye exhibits no discharge. Neck: Normal range of motion. Neck supple. Cardiovascular: Normal rate, regular rhythm and normal heart sounds. Pulmonary/Chest: Effort normal and breath sounds normal. No stridor. No respiratory distress. She has no wheezes. She has no rales. She exhibits no tenderness. Few fine crackles to bases, few scattered wheezes - cleared with cough  No sob with speech or ambuation to exam room   Abdominal: Soft. Bowel sounds are normal. She exhibits no distension. There is no tenderness. Musculoskeletal: Normal range of motion. She exhibits no tenderness or deformity. Ambulated to and from room, gait is steady, moving all extremities without difficulty   Lymphadenopathy:     She has no cervical adenopathy. Neurological: She is alert and oriented to person, place, and time. She exhibits normal muscle tone. Coordination normal.   Skin: Skin is warm and dry. No rash noted. She is not diaphoretic. Psychiatric: She has a normal mood and affect. Her behavior is normal.   Nursing note and vitals reviewed. /80   Pulse 83   Temp 98.3 °F (36.8 °C) (Tympanic)   Resp 20   Wt 176 lb (79.8 kg)   SpO2 96%   BMI 34.37 kg/m²     Assessment:       Diagnosis Orders   1. Moderate persistent asthmatic bronchitis without complication     2.  Cough with

## 2019-05-08 NOTE — PATIENT INSTRUCTIONS
Follow up with family doctor in 1 week as needed. Return immediately if worse, new symptoms develop, symptoms persist or have any questions or concerns. Push fluids, keep hydrated  Cool mist humidifier bedside  Continue all medications as prescribed  May alternate tylenol/motrin over the counter for pain or fever, take per package instructions. Patient Education        Asthma in Adults: Care Instructions  Your Care Instructions    During an asthma attack, your airways swell and narrow as a reaction to certain things (triggers). This makes it hard to breathe. You may be able to prevent asthma attacks if you avoid the things that set off your asthma symptoms. Keeping your asthma under control and treating symptoms before they get bad can help you avoid severe attacks. If you can control your asthma, you may be able to do all of your normal daily activities. You may also avoid asthma attacks and trips to the hospital.  Follow-up care is a key part of your treatment and safety. Be sure to make and go to all appointments, and call your doctor if you are having problems. It's also a good idea to know your test results and keep a list of the medicines you take. How can you care for yourself at home? · Follow your asthma action plan so you can manage your symptoms at home. An asthma action plan will help you prevent and control airway reactions and will tell you what to do during an asthma attack. If you do not have an asthma action plan, work with your doctor to build one. · Take your asthma medicine exactly as prescribed. Medicine plays an important role in controlling asthma. Talk to your doctor right away if you have any questions about what to take and how to take it. ? Use your quick-relief medicine when you have symptoms of an attack. Quick-relief medicine often is an albuterol inhaler. Some people need to use quick-relief medicine before they exercise. ?  Take your controller medicine every day, not just when you have symptoms. Controller medicine is usually an inhaled corticosteroid. The goal is to prevent problems before they occur. Do not use your controller medicine to try to treat an attack that has already started. It does not work fast enough to help. ? If your doctor prescribed corticosteroid pills to use during an attack, take them as directed. They may take hours to work, but they may shorten the attack and help you breathe better. ? Keep your quick-relief medicine with you at all times. · Talk to your doctor before using other medicines. Some medicines, such as aspirin, can cause asthma attacks in some people. · Check yourself for asthma symptoms to know which step to follow in your action plan. Watch for things like being short of breath, having chest tightness, coughing, and wheezing. Also notice if symptoms wake you up at night or if you get tired quickly when you exercise. · If you have a peak flow meter, use it to check how well you are breathing. This can help you predict when an asthma attack is going to occur. Then you can take medicine to prevent the asthma attack or make it less severe. · See your doctor regularly. These visits will help you learn more about asthma and what you can do to control it. Your doctor will monitor your treatment to make sure the medicine is helping you. · Keep track of your asthma attacks and your treatment. After you have had an attack, write down what triggered it, what helped end it, and any concerns you have about your asthma action plan. Take your diary when you see your doctor. You can then review your asthma action plan and decide if it is working. · Do not smoke or allow others to smoke around you. Avoid smoky places. Smoking makes asthma worse. If you need help quitting, talk to your doctor about stop-smoking programs and medicines. These can increase your chances of quitting for good.   · Learn what triggers an asthma attack for you, and avoid the triggers when you can. Common triggers include colds, smoke, air pollution, dust, pollen, mold, pets, cockroaches, stress, and cold air. · Avoid colds and the flu. Get a pneumococcal vaccine shot. If you have had one before, ask your doctor whether you need a second dose. Get a flu vaccine every fall. If you must be around people with colds or the flu, wash your hands often. When should you call for help? Call 911 anytime you think you may need emergency care. For example, call if:    · You have severe trouble breathing.    Call your doctor now or seek immediate medical care if:    · Your symptoms do not get better after you have followed your asthma action plan.     · You cough up yellow, dark brown, or bloody mucus (sputum).    Watch closely for changes in your health, and be sure to contact your doctor if:    · Your coughing and wheezing get worse.     · You need to use quick-relief medicine on more than 2 days a week (unless it is just for exercise).     · You need help figuring out what is triggering your asthma attacks. Where can you learn more? Go to https://[employer].SwipeGood. org and sign in to your [employer] account. Enter P597 in the BigML box to learn more about \"Asthma in Adults: Care Instructions. \"     If you do not have an account, please click on the \"Sign Up Now\" link. Current as of: September 5, 2018  Content Version: 12.0  © 6263-6178 [employer] Care instructions adapted under license by [facility] ([facility]). If you have questions about a medical condition or this instruction, always ask your healthcare professional. [place] [address] any warranty or liability for your use of this information. Patient Education        Bronchitis: Care Instructions  Your Care Instructions    Bronchitis is inflammation of the bronchial tubes, which carry air to the lungs. The tubes swell and produce mucus, or phlegm.  The mucus and inflamed bronchial tubes make you cough. You may have trouble breathing. Most cases of bronchitis are caused by viruses like those that cause colds. Antibiotics usually do not help and they may be harmful. Bronchitis usually develops rapidly and lasts about 2 to 3 weeks in otherwise healthy people. Follow-up care is a key part of your treatment and safety. Be sure to make and go to all appointments, and call your doctor if you are having problems. It's also a good idea to know your test results and keep a list of the medicines you take. How can you care for yourself at home? · Take all medicines exactly as prescribed. Call your doctor if you think you are having a problem with your medicine. · Get some extra rest.  · Take an over-the-counter pain medicine, such as acetaminophen (Tylenol), ibuprofen (Advil, Motrin), or naproxen (Aleve) to reduce fever and relieve body aches. Read and follow all instructions on the label. · Do not take two or more pain medicines at the same time unless the doctor told you to. Many pain medicines have acetaminophen, which is Tylenol. Too much acetaminophen (Tylenol) can be harmful. · Take an over-the-counter cough medicine that contains dextromethorphan to help quiet a dry, hacking cough so that you can sleep. Avoid cough medicines that have more than one active ingredient. Read and follow all instructions on the label. · Breathe moist air from a humidifier, hot shower, or sink filled with hot water. The heat and moisture will thin mucus so you can cough it out. · Do not smoke. Smoking can make bronchitis worse. If you need help quitting, talk to your doctor about stop-smoking programs and medicines. These can increase your chances of quitting for good. When should you call for help? Call 911 anytime you think you may need emergency care.  For example, call if:    · You have severe trouble breathing.    Call your doctor now or seek immediate medical care if:    · You have new or worse trouble breathing.     · You cough up dark brown or bloody mucus (sputum).     · You have a new or higher fever.     · You have a new rash.    Watch closely for changes in your health, and be sure to contact your doctor if:    · You cough more deeply or more often, especially if you notice more mucus or a change in the color of your mucus.     · You are not getting better as expected. Where can you learn more? Go to https://Live[employer] MobilepeHooplaeb.Swift Endeavor. org and sign in to your [employer] account. Enter H333 in the DreamDry box to learn more about \"Bronchitis: Care Instructions. \"     If you do not have an account, please click on the \"Sign Up Now\" link. Current as of: September 5, 2018  Content Version: 12.0  © 1004-7281 [employer]. Care instructions adapted under license by [facility] ([facility]). If you have questions about a medical condition or this instruction, always ask your healthcare professional. [place] [address] any warranty or liability for your use of this information.

## 2019-06-10 ENCOUNTER — OFFICE VISIT (OUTPATIENT)
Dept: INTERNAL MEDICINE | Age: 74
End: 2019-06-10
Payer: MEDICARE

## 2019-06-10 VITALS
HEART RATE: 85 BPM | BODY MASS INDEX: 34.63 KG/M2 | WEIGHT: 176.4 LBS | SYSTOLIC BLOOD PRESSURE: 166 MMHG | DIASTOLIC BLOOD PRESSURE: 76 MMHG | HEIGHT: 60 IN

## 2019-06-10 DIAGNOSIS — E78.5 DYSLIPIDEMIA: ICD-10-CM

## 2019-06-10 DIAGNOSIS — R05.9 COUGH: ICD-10-CM

## 2019-06-10 DIAGNOSIS — E11.65 TYPE 2 DIABETES MELLITUS WITH HYPERGLYCEMIA, WITHOUT LONG-TERM CURRENT USE OF INSULIN (HCC): ICD-10-CM

## 2019-06-10 DIAGNOSIS — J30.1 SEASONAL ALLERGIC RHINITIS DUE TO POLLEN: Primary | ICD-10-CM

## 2019-06-10 DIAGNOSIS — I10 ESSENTIAL HYPERTENSION: ICD-10-CM

## 2019-06-10 DIAGNOSIS — K21.9 GASTROESOPHAGEAL REFLUX DISEASE WITHOUT ESOPHAGITIS: ICD-10-CM

## 2019-06-10 DIAGNOSIS — J45.40 MODERATE PERSISTENT ASTHMA WITHOUT COMPLICATION: ICD-10-CM

## 2019-06-10 PROCEDURE — 3017F COLORECTAL CA SCREEN DOC REV: CPT | Performed by: INTERNAL MEDICINE

## 2019-06-10 PROCEDURE — 4040F PNEUMOC VAC/ADMIN/RCVD: CPT | Performed by: INTERNAL MEDICINE

## 2019-06-10 PROCEDURE — 2022F DILAT RTA XM EVC RTNOPTHY: CPT | Performed by: INTERNAL MEDICINE

## 2019-06-10 PROCEDURE — G8427 DOCREV CUR MEDS BY ELIG CLIN: HCPCS | Performed by: INTERNAL MEDICINE

## 2019-06-10 PROCEDURE — 3044F HG A1C LEVEL LT 7.0%: CPT | Performed by: INTERNAL MEDICINE

## 2019-06-10 PROCEDURE — 99211 OFF/OP EST MAY X REQ PHY/QHP: CPT | Performed by: INTERNAL MEDICINE

## 2019-06-10 PROCEDURE — 99214 OFFICE O/P EST MOD 30 MIN: CPT | Performed by: INTERNAL MEDICINE

## 2019-06-10 PROCEDURE — 1123F ACP DISCUSS/DSCN MKR DOCD: CPT | Performed by: INTERNAL MEDICINE

## 2019-06-10 PROCEDURE — G8399 PT W/DXA RESULTS DOCUMENT: HCPCS | Performed by: INTERNAL MEDICINE

## 2019-06-10 PROCEDURE — G8417 CALC BMI ABV UP PARAM F/U: HCPCS | Performed by: INTERNAL MEDICINE

## 2019-06-10 PROCEDURE — 1036F TOBACCO NON-USER: CPT | Performed by: INTERNAL MEDICINE

## 2019-06-10 PROCEDURE — 1090F PRES/ABSN URINE INCON ASSESS: CPT | Performed by: INTERNAL MEDICINE

## 2019-06-10 RX ORDER — FLUTICASONE PROPIONATE 110 UG/1
2 AEROSOL, METERED RESPIRATORY (INHALATION) 2 TIMES DAILY
Qty: 1 INHALER | Refills: 3 | Status: SHIPPED | OUTPATIENT
Start: 2019-06-10 | End: 2020-06-09

## 2019-06-10 RX ORDER — FLUTICASONE PROPIONATE 50 MCG
2 SPRAY, SUSPENSION (ML) NASAL DAILY
Qty: 1 BOTTLE | Refills: 0 | Status: SHIPPED | OUTPATIENT
Start: 2019-06-10

## 2019-06-10 RX ORDER — MONTELUKAST SODIUM 10 MG/1
TABLET ORAL
Qty: 90 TABLET | Refills: 5 | Status: SHIPPED | OUTPATIENT
Start: 2019-06-10

## 2019-06-10 RX ORDER — BUDESONIDE AND FORMOTEROL FUMARATE DIHYDRATE 160; 4.5 UG/1; UG/1
AEROSOL RESPIRATORY (INHALATION)
Qty: 3 INHALER | Refills: 5 | Status: SHIPPED | OUTPATIENT
Start: 2019-06-10

## 2019-06-10 RX ORDER — AMLODIPINE BESYLATE 10 MG/1
10 TABLET ORAL DAILY
Qty: 30 TABLET | Refills: 5 | Status: SHIPPED | OUTPATIENT
Start: 2019-06-10

## 2019-06-10 RX ORDER — FENOFIBRATE 48 MG/1
48 TABLET, COATED ORAL DAILY
Qty: 90 TABLET | Refills: 1 | Status: SHIPPED | OUTPATIENT
Start: 2019-06-10

## 2019-06-10 RX ORDER — PRAVASTATIN SODIUM 40 MG
TABLET ORAL
Qty: 90 TABLET | Refills: 5 | Status: SHIPPED | OUTPATIENT
Start: 2019-06-10

## 2019-06-10 RX ORDER — HYDROCHLOROTHIAZIDE 12.5 MG/1
12.5 CAPSULE, GELATIN COATED ORAL DAILY
Qty: 90 CAPSULE | Refills: 3 | Status: SHIPPED | OUTPATIENT
Start: 2019-06-10

## 2019-06-10 RX ORDER — RANITIDINE 150 MG/1
TABLET ORAL
Qty: 180 TABLET | Refills: 5 | Status: SHIPPED | OUTPATIENT
Start: 2019-06-10

## 2019-06-10 ASSESSMENT — ENCOUNTER SYMPTOMS
EYE ITCHING: 1
COUGH: 1
GASTROINTESTINAL NEGATIVE: 1

## 2019-06-10 NOTE — PROGRESS NOTES
[facility]   Progress Note      Chronic Disease Visit Information    BP Readings from Last 3 Encounters:   05/08/19 138/80   03/04/19 (!) 158/76   11/12/18 (!) 142/72          Hemoglobin A1C (%)   Date Value   03/04/2019 6.1   08/30/2018 6.2 (H)   10/05/2017 5.9     Microalb/Crt. Ratio (mcg/mg creat)   Date Value   08/30/2018 CANNOT BE CALCULATED     LDL Cholesterol (mg/dL)   Date Value   08/30/2018 57     HDL (mg/dL)   Date Value   08/30/2018 36 (L)     BUN (mg/dL)   Date Value   08/30/2018 20     CREATININE (mg/dL)   Date Value   08/30/2018 0.60     Glucose (mg/dL)   Date Value   08/30/2018 113 (H)   12/12/2011 137 (H)            Have you changed or started any medications since your last visit including any over-the-counter medicines, vitamins, or herbal medicines? yes - Calcium,Zyrtec and Melatonin  Are you having any side effects from any of your medications? -  no  Have you stopped taking any of your medications? Is so, why? -  no    Have you seen any other physician or provider since your last visit? Yes - Records Obtained  Have you had any other diagnostic tests since your last visit? Yes - Records Obtained  Have you been seen in the emergency room and/or had an admission to a hospital since we last saw you? No  Have you had your annual diabetic retinal (eye) exam? Yes - Records Obtained  Have you had your routine dental cleaning in the past 6 months? no    Have you activated your [employer] account? If not, what are your barriers?  Yes     Patient Care Team:  Ayden Vogel MD as PCP - General (Internal Medicine)  Ayden Vogel MD as PCP - [facility]         Medical History Review  Past Medical, Family, and Social History reviewed and does contribute to the patient presenting condition    Health Maintenance   Topic Date Due    Diabetic retinal exam  08/15/2018    Diabetic foot exam  10/05/2018    Diabetic microalbuminuria test  08/30/2019    Lipid screen  08/30/2019    Potassium monitoring  08/30/2019    Creatinine monitoring  08/30/2019    A1C test (Diabetic or Prediabetic)  03/04/2020    Breast cancer screen  05/01/2020    Colon cancer screen colonoscopy  05/08/2023    DTaP/Tdap/Td vaccine (2 - Td) 11/06/2027    Flu vaccine  Completed    Shingles Vaccine  Completed    Pneumococcal 65+ years Vaccine  Completed    DEXA (modify frequency per FRAX score)  Addressed    Hepatitis C screen  Addressed       Date of patient's visit: 6/10/2019  Patient's Name:  Lucía Heath                   YOB: 1945        PCP:  Noel Gonzalez MD    Lucía Heath is a 72 y.o. female who presents for   Chief Complaint   Patient presents with    3 Month Follow-Up    Diabetes    Hypertension    and follow up of chronic medical problems. Patient Active Problem List   Diagnosis    Post hysterectomy menopause    Asthma    Stressful life event affecting family    GERD (gastroesophageal reflux disease)    DM2 (diabetes mellitus, type 2) ([address]    HBP (high blood pressure)    Mitral regurgitation    Vulvar dystrophy    Tubular adenoma of colon    Diverticulosis of colon    Hypermetropia    Nuclear senile cataract    Presbyopia    Regular astigmatism       HISTORY OF PRESENT ILLNESS:    History was obtained from the patient. Treatment Adherence:   Medication compliance:  compliant all of the time  Diet compliance:  compliant all of the time  Weight trend: stable  Current exercise: no regular exercise  Barriers: impairment:  physical: knee needs replaced    Diabetes Mellitus Type 2: Current symptoms/problems include none. Home blood sugar records: patient does not test  Any episodes of hypoglycemia? no  Eye exam current (within one year): yes  Tobacco history: She  reports that she has never smoked. She has never used smokeless tobacco.   Daily Aspirin? No:     Hypertension:  Home blood pressure monitoring: Yes - . She is adherent to a low sodium diet.  Patient denies chest pain and shortness of breath. Antihypertensive medication side effects: no medication side effects noted. Use of agents associated with hypertension: none. Hyperlipidemia:  No new myalgias or GI upset on fenofibrate (Tricor, Trilipix) and pravastatin (Pravachol). Lab Results   Component Value Date    LABA1C 6.1 03/04/2019    LABA1C 6.2 (H) 08/30/2018    LABA1C 5.9 10/05/2017     Lab Results   Component Value Date    LABMICR CANNOT BE CALCULATED 08/30/2018    CREATININE 0.60 08/30/2018     Lab Results   Component Value Date    ALT 17 08/30/2018    AST 18 08/30/2018     Lab Results   Component Value Date    CHOL 152 08/30/2018    TRIG 294 (H) 08/30/2018    HDL 36 (L) 08/30/2018        Allergic Rhinitis  Montserrat Del Rio is here for evaluation of possible allergic rhinitis. Patient's symptoms include clear rhinorrhea, cough, itchy eyes and postnasal drip. These symptoms are seasonal. Current triggers include exposure to pollens. The patient has been suffering from these symptoms for approximately 2 weeks. The patient has tried over the counter medications and prescription antihistamines with fair relief of symptoms. Immunotherapy has never been tried. The patient has never had nasal polyps. The patient has no history of asthma. The patient has no history of eczema. The patient does not suffer from frequent sinopulmonary infections. The patient has not had sinus surgery in the past.   Patient's allergies, medications, past medical, surgical, social and family histories were reviewed and updated as appropriate.     ALLERGIES      Allergies   Allergen Reactions    Asa [Aspirin]     Codeine     Mycinettes     Pcn [Penicillins]     Sulfa Antibiotics     Ace Inhibitors Rash     Rash occurs Even with ARBs         MEDICATIONS:      Current Outpatient Medications   Medication Sig Dispense Refill    azithromycin (ZITHROMAX Z-STACEY) 250 MG tablet Take 2 tablets (500 mg) on Day 1, and then take 1 tablet (250 mg) on days 2 through 5. 1 packet 0    fluticasone (FLONASE) 50 MCG/ACT nasal spray 2 sprays by Nasal route daily 1 Bottle 0    metFORMIN (GLUCOPHAGE) 500 MG tablet TAKE 1 TABLET BY MOUTH 2  TIMES A  tablet 3    Melatonin 5 MG CAPS Take 1 capsule by mouth nightly.  30 capsule 5    budesonide-formoterol (SYMBICORT) 160-4.5 MCG/ACT AERO INHALE 2 PUFFS INTO THE LUNGS TWICE A DAY 3 Inhaler 5    ranitidine (ZANTAC) 150 MG tablet TAKE 1 TABLET BY MOUTH  TWICE A  tablet 5    hydrochlorothiazide (MICROZIDE) 12.5 MG capsule Take 1 capsule by mouth daily 90 capsule 3    montelukast (SINGULAIR) 10 MG tablet TAKE 1 TABLET BY MOUTH  DAILY 90 tablet 5    fluticasone (FLOVENT HFA) 110 MCG/ACT inhaler Inhale 2 puffs into the lungs 2 times daily 1 Inhaler 3    glucose monitoring kit (FREESTYLE) monitoring kit 1 kit by Does not apply route daily 1 kit 0    blood glucose monitor strips 1 strip by Other route 3 times daily (before meals) Diagnosis: 250.0 100 strip 11    Lancets MISC 1 each by Does not apply route daily Diagnisis:250.0  Diabetes Mellitus 100 each 11    Alcohol Swabs (ALCOHOL PREP) 70 % PADS Use 3 times per day Diagnosis: 250.0   Diabetes 100 each 11    fenofibrate (TRICOR) 48 MG tablet Take 1 tablet by mouth daily 90 tablet 1    amLODIPine (NORVASC) 10 MG tablet Take 1 tablet by mouth daily 30 tablet 5    Calcium Carb-Cholecalciferol 600-1000 MG-UNIT CAPS Take by mouth      FLUZONE HIGH-DOSE 0.5 ML DOROTHY injection ADM 0.5ML IM UTD  0    PNEUMOVAX 23 25 MCG/0.5ML inj ADM 0.5ML IM UTD  0    cetirizine (ZYRTEC) 10 MG tablet Take 10 mg by mouth daily      pravastatin (PRAVACHOL) 40 MG tablet TAKE 1 TABLET BY MOUTH  EVERY EVENING 90 tablet 5    PROAIR  (90 Base) MCG/ACT inhaler INHALE 2 PUFFS INTO THE  LUNGS EVERY 6 HOURS AS  NEEDED 34 g 3    sodium chloride (ALTAMIST SPRAY) 0.65 % nasal spray 1 spray by Nasal route as needed for Congestion 1 Bottle 3    Spacer/Aero-Holding Fanta Canchola 1 Device by Does not apply route daily as needed. 1 Device 0    clobetasol (TEMOVATE) 0.05 % ointment Apply  topically 2 times daily. Apply topically 2 times daily. No current facility-administered medications for this visit. Patient Care Team:  Perry Merida MD as PCP - General (Internal Medicine)  Perry Merida MD as PCP - [facility] Provider    PAST MEDICAL AND SURGICAL HISTORY:      Past Medical History:   Diagnosis Date    Asthma     Diverticulosis of colon     GERD (gastroesophageal reflux disease)     Hypertension     Tubular adenoma of colon 05/08/2018    Type II or unspecified type diabetes mellitus without mention of complication, not stated as uncontrolled      Past Surgical History:   Procedure Laterality Date    APPENDECTOMY      CATARACT REMOVAL WITH IMPLANT Bilateral     COLONOSCOPY  05/08/2018    Small polyp right colon--TUBULAR ADENOMA, diverticulosis    COLONOSCOPY N/A 5/8/2018    COLONOSCOPY WITH BIOPSY performed by Noel Haley MD at [address] [place] [address]      subtotal    TONSILLECTOMY AND ADENOIDECTOMY         SOCIAL HISTORY      Social History     Tobacco Use    Smoking status: Never Smoker    Smokeless tobacco: Never Used   Substance Use Topics    Alcohol use: No     Yeny Ball  reports that she has never smoked. She has never used smokeless tobacco.    FAMILY HISTORY:    No family history on file. REVIEW OF SYSTEMS:    Review of Systems   Constitutional: Negative. HENT: Positive for postnasal drip. Eyes: Positive for itching. Respiratory: Positive for cough. Cardiovascular: Negative. Gastrointestinal: Negative. Endocrine: Negative. Genitourinary: Negative. Musculoskeletal: Positive for arthralgias. Skin: Negative. Allergic/Immunologic: Positive for environmental allergies. Neurological: Negative. Hematological: Negative. Psychiatric/Behavioral: Negative.         PHYSICAL EXAM:      Vitals: 06/10/19 0957   BP: (!) 166/76   Pulse:      BP Readings from Last 3 Encounters:   06/10/19 (!) 166/76   05/08/19 138/80   03/04/19 (!) 158/76       Physical Examination: General appearance - alert, well appearing, and in no distress  Mental status - alert, oriented to person, place, and time  Chest - clear to auscultation, no wheezes, rales or rhonchi, symmetric air entry  Heart - normal rate and regular rhythm  Abdomen - bowel sounds normal  Back exam - full range of motion, no tenderness, palpable spasm or pain on motion  Neurological - alert, oriented, normal speech, no focal findings or movement disorder noted  Musculoskeletal - osteoarthritic changes noted in both hands  Extremities - no pedal edema noted    LABORATORY FINDINGS:    CBC:   Lab Results   Component Value Date    WBC 8.2 10/10/2018    HGB 12.6 10/10/2018     10/10/2018     BMP:    Lab Results   Component Value Date     08/30/2018    K 4.3 08/30/2018     08/30/2018    CO2 26 08/30/2018    BUN 20 08/30/2018    CREATININE 0.60 08/30/2018    GLUCOSE 113 08/30/2018    GLUCOSE 137 12/12/2011     Hemoglobin A1C:   Lab Results   Component Value Date    LABA1C 6.1 03/04/2019     Microalbumin Urine:   Lab Results   Component Value Date    MICROALBUR <12 08/30/2018     Lipid profile:   Lab Results   Component Value Date    CHOL 152 08/30/2018    TRIG 294 08/30/2018    HDL 36 08/30/2018     Thyroid functions:   Lab Results   Component Value Date    TSH 3.14 09/25/2015      Hepatic functions:   Lab Results   Component Value Date    ALT 17 08/30/2018    AST 18 08/30/2018    PROT 6.8 08/30/2018    BILITOT 0.39 08/30/2018    BILIDIR <0.08 05/05/2016    LABALBU 4.0 08/30/2018     Urine Analysis: No results found for: [id number] [address]:      Health Maintenance Due   Topic Date Due    Diabetic retinal exam  08/15/2018    Diabetic foot exam  10/05/2018       ASSESSMENT AND PLAN:       Diagnosis Orders   1.  Seasonal allergic rhinitis due to pollen     2. Type 2 diabetes mellitus with hyperglycemia, without long-term current use of insulin ([facility])  metFORMIN (GLUCOPHAGE) 500 MG tablet   3. Gastroesophageal reflux disease without esophagitis  ranitidine (ZANTAC) 150 MG tablet   4. Moderate persistent asthma without complication  montelukast (SINGULAIR) 10 MG tablet   5. Cough  fluticasone (FLOVENT HFA) 110 MCG/ACT inhaler   6. Dyslipidemia  fenofibrate (TRICOR) 48 MG tablet    Lipid Panel   7. Essential hypertension  amLODIPine (NORVASC) 10 MG tablet   Continue flonase    Moving to [facility]. FOLLOW UP:   1. Montserrat received counseling on the following healthy behaviors: nutrition and exercise    2. Reviewed prior labs and health maintenance. 3.  Discussed use, benefit, and side effects of prescribed medications. Barriers to medication compliance addressed. All patient questions answered. Pt voiced understanding. 4.  Continue current medications, diet and exercise.               Orders Placed This Encounter   Medications    metFORMIN (GLUCOPHAGE) 500 MG tablet     Sig: TAKE 1 TABLET BY MOUTH 2  TIMES A DAY     Dispense:  270 tablet     Refill:  3    budesonide-formoterol (SYMBICORT) 160-4.5 MCG/ACT AERO     Sig: INHALE 2 PUFFS INTO THE LUNGS TWICE A DAY     Dispense:  3 Inhaler     Refill:  5    ranitidine (ZANTAC) 150 MG tablet     Sig: TAKE 1 TABLET BY MOUTH  TWICE A DAY     Dispense:  180 tablet     Refill:  5    hydrochlorothiazide (MICROZIDE) 12.5 MG capsule     Sig: Take 1 capsule by mouth daily     Dispense:  90 capsule     Refill:  3    montelukast (SINGULAIR) 10 MG tablet     Sig: TAKE 1 TABLET BY MOUTH  DAILY     Dispense:  90 tablet     Refill:  5    fluticasone (FLOVENT HFA) 110 MCG/ACT inhaler     Sig: Inhale 2 puffs into the lungs 2 times daily     Dispense:  1 Inhaler     Refill:  3    fenofibrate (TRICOR) 48 MG tablet     Sig: Take 1 tablet by mouth daily     Dispense:  90 tablet     Refill:  1    amLODIPine (NORVASC) 10 MG tablet     Sig: Take 1 tablet by mouth daily     Dispense:  30 tablet     Refill:  5    pravastatin (PRAVACHOL) 40 MG tablet     Sig: TAKE 1 TABLET BY MOUTH  EVERY EVENING     Dispense:  90 tablet     Refill:  5    fluticasone (FLONASE) 50 MCG/ACT nasal spray     Si sprays by Nasal route daily     Dispense:  1 Bottle     Refill:  0          Completed Refills               Requested Prescriptions     Signed Prescriptions Disp Refills    metFORMIN (GLUCOPHAGE) 500 MG tablet 270 tablet 3     Sig: TAKE 1 TABLET BY MOUTH 2  TIMES A DAY    budesonide-formoterol (SYMBICORT) 160-4.5 MCG/ACT AERO 3 Inhaler 5     Sig: INHALE 2 PUFFS INTO THE LUNGS TWICE A DAY    ranitidine (ZANTAC) 150 MG tablet 180 tablet 5     Sig: TAKE 1 TABLET BY MOUTH  TWICE A DAY    hydrochlorothiazide (MICROZIDE) 12.5 MG capsule 90 capsule 3     Sig: Take 1 capsule by mouth daily    montelukast (SINGULAIR) 10 MG tablet 90 tablet 5     Sig: TAKE 1 TABLET BY MOUTH  DAILY    fluticasone (FLOVENT HFA) 110 MCG/ACT inhaler 1 Inhaler 3     Sig: Inhale 2 puffs into the lungs 2 times daily    fenofibrate (TRICOR) 48 MG tablet 90 tablet 1     Sig: Take 1 tablet by mouth daily    amLODIPine (NORVASC) 10 MG tablet 30 tablet 5     Sig: Take 1 tablet by mouth daily    pravastatin (PRAVACHOL) 40 MG tablet 90 tablet 5     Sig: TAKE 1 TABLET BY MOUTH  EVERY EVENING    fluticasone (FLONASE) 50 MCG/ACT nasal spray 1 Bottle 0     Si sprays by Nasal route daily       5. Patient given educational materials - see patient instructions    6. Was a self-tracking handout given in paper form or via [employer]? Yes  If yes, see orders or list here. Orders Placed This Encounter   Procedures    Lipid Panel     Standing Status:   Future     Standing Expiration Date:   7/10/2019     Order Specific Question:   Is Patient Fasting?/# of Hours     Answer:   12 hours       No follow-ups on file.     Patient voiced understanding and agreed to treatment plan.     This note is created with the assistance of a speech-recognition program. While intending to generate a document that actually reflects the content of the visit, the document can still have some mistakes which may not have been identified and corrected by editing.     Dr Tanvi Gasca MD, [address]  Associate , Department of Internal Medicine  [address] [place] [address]  Attending [address] [facility], [place] [address]                   6/10/2019, 10:14 AM

## 2019-06-10 NOTE — PATIENT INSTRUCTIONS
Medications e-scribe to pharmacy of pt's choice. Scripts for lab given to pt with fasting instructions, pt will get labs done as soon as possible. Patient to return to clinic as needed. AVS reviewed and given to pt. It is very important for your care that you keep your appointment. If for some reason you are unable to keep your appointment it is equally important that you call our office at [phone] to cancel your appointment and reschedule. Failure to do so may result in your termination from our practice.   MB

## 2019-09-26 ENCOUNTER — TELEPHONE (OUTPATIENT)
Dept: INTERNAL MEDICINE | Age: 74
End: 2019-09-26

## 2020-01-03 ENCOUNTER — TELEPHONE (OUTPATIENT)
Dept: INTERNAL MEDICINE | Age: 75
End: 2020-01-03

## 2020-01-03 NOTE — LETTER
[address]  [address] [address]  Phone: [phone]  Fax: [phone]    Theodore Luis MD        January 3, 2020    [address] [place] [address]      Dear Herminia Marcano:    We are sending this letter because your PCP ordered [facility] for you to have done at your last visit here and they have not yet been completed. If you can please come to our office on the 2nd floor to  your orders to have them compelted. If you do not have a follow-up appointment scheduled you can either contact the office to make an appointment with us or you can make one when you come in to pick-up your orders. If you have any questions or concerns, please don't hesitate to call.     Sincerely,        Theodore Luis MD

## (undated) DEVICE — CANNULA NSL AD TBNG L7FT PVC STR NONFLARED PRNG O2 DEL W STD

## (undated) DEVICE — DUP USE 393023 JELLY LUBRICATING EZ 2OZ

## (undated) DEVICE — DEFENDO AIR WATER SUCTION AND BIOPSY VALVE KIT FOR  OLYMPUS: Brand: DEFENDO AIR/WATER/SUCTION AND BIOPSY VALVE